# Patient Record
Sex: FEMALE | Race: WHITE | NOT HISPANIC OR LATINO | Employment: FULL TIME | URBAN - METROPOLITAN AREA
[De-identification: names, ages, dates, MRNs, and addresses within clinical notes are randomized per-mention and may not be internally consistent; named-entity substitution may affect disease eponyms.]

---

## 2019-05-08 PROBLEM — E66.811 CLASS 1 OBESITY DUE TO EXCESS CALORIES WITH SERIOUS COMORBIDITY AND BODY MASS INDEX (BMI) OF 34.0 TO 34.9 IN ADULT: Status: ACTIVE | Noted: 2017-12-07

## 2019-05-08 PROBLEM — E88.810 METABOLIC SYNDROME: Status: ACTIVE | Noted: 2017-10-09

## 2022-09-07 ENCOUNTER — TELEPHONE (OUTPATIENT)
Dept: INTERNAL MEDICINE CLINIC | Facility: OTHER | Age: 42
End: 2022-09-07

## 2023-06-07 ENCOUNTER — APPOINTMENT (OUTPATIENT)
Dept: LAB | Age: 43
End: 2023-06-07
Payer: COMMERCIAL

## 2023-06-07 DIAGNOSIS — R74.8 ELEVATED ALKALINE PHOSPHATASE LEVEL: ICD-10-CM

## 2023-06-07 DIAGNOSIS — E88.81 METABOLIC SYNDROME: ICD-10-CM

## 2023-06-07 LAB
ALBUMIN SERPL BCP-MCNC: 3.7 G/DL (ref 3.5–5)
ALP SERPL-CCNC: 114 U/L (ref 46–116)
ALT SERPL W P-5'-P-CCNC: 27 U/L (ref 12–78)
ANION GAP SERPL CALCULATED.3IONS-SCNC: 2 MMOL/L (ref 4–13)
AST SERPL W P-5'-P-CCNC: 16 U/L (ref 5–45)
BILIRUB DIRECT SERPL-MCNC: 0.11 MG/DL (ref 0–0.2)
BILIRUB SERPL-MCNC: 0.74 MG/DL (ref 0.2–1)
BUN SERPL-MCNC: 13 MG/DL (ref 5–25)
CALCIUM SERPL-MCNC: 8.9 MG/DL (ref 8.3–10.1)
CHLORIDE SERPL-SCNC: 111 MMOL/L (ref 96–108)
CO2 SERPL-SCNC: 26 MMOL/L (ref 21–32)
CREAT SERPL-MCNC: 0.68 MG/DL (ref 0.6–1.3)
GFR SERPL CREATININE-BSD FRML MDRD: 108 ML/MIN/1.73SQ M
GLUCOSE P FAST SERPL-MCNC: 86 MG/DL (ref 65–99)
POTASSIUM SERPL-SCNC: 4.5 MMOL/L (ref 3.5–5.3)
PROT SERPL-MCNC: 7 G/DL (ref 6.4–8.4)
SODIUM SERPL-SCNC: 139 MMOL/L (ref 135–147)

## 2023-06-07 PROCEDURE — 82248 BILIRUBIN DIRECT: CPT

## 2023-06-07 PROCEDURE — 86381 MITOCHONDRIAL ANTIBODY EACH: CPT

## 2023-06-07 PROCEDURE — 80053 COMPREHEN METABOLIC PANEL: CPT

## 2023-06-07 PROCEDURE — 36415 COLL VENOUS BLD VENIPUNCTURE: CPT

## 2023-06-08 LAB — MITOCHONDRIA M2 IGG SER-ACNC: <20 UNITS (ref 0–20)

## 2023-06-09 ENCOUNTER — OFFICE VISIT (OUTPATIENT)
Dept: INTERNAL MEDICINE CLINIC | Facility: CLINIC | Age: 43
End: 2023-06-09
Payer: COMMERCIAL

## 2023-06-09 VITALS
TEMPERATURE: 98.4 F | OXYGEN SATURATION: 96 % | HEART RATE: 86 BPM | HEIGHT: 63 IN | WEIGHT: 198.4 LBS | SYSTOLIC BLOOD PRESSURE: 120 MMHG | DIASTOLIC BLOOD PRESSURE: 94 MMHG | BODY MASS INDEX: 35.15 KG/M2

## 2023-06-09 DIAGNOSIS — E78.1 HYPERTRIGLYCERIDEMIA: ICD-10-CM

## 2023-06-09 DIAGNOSIS — Z12.31 ENCOUNTER FOR SCREENING MAMMOGRAM FOR MALIGNANT NEOPLASM OF BREAST: Primary | ICD-10-CM

## 2023-06-09 DIAGNOSIS — E55.9 VITAMIN D DEFICIENCY: ICD-10-CM

## 2023-06-09 DIAGNOSIS — E66.9 OBESITY (BMI 30.0-34.9): ICD-10-CM

## 2023-06-09 DIAGNOSIS — I10 BENIGN ESSENTIAL HYPERTENSION: ICD-10-CM

## 2023-06-09 DIAGNOSIS — F41.9 ANXIETY: ICD-10-CM

## 2023-06-09 PROCEDURE — 99214 OFFICE O/P EST MOD 30 MIN: CPT | Performed by: NURSE PRACTITIONER

## 2023-06-09 RX ORDER — LOSARTAN POTASSIUM AND HYDROCHLOROTHIAZIDE 12.5; 5 MG/1; MG/1
1 TABLET ORAL DAILY
Qty: 30 TABLET | Refills: 0 | Status: SHIPPED | OUTPATIENT
Start: 2023-06-09

## 2023-06-09 RX ORDER — LORAZEPAM 0.5 MG/1
0.5 TABLET ORAL EVERY 8 HOURS PRN
Qty: 10 TABLET | Refills: 0 | Status: SHIPPED | OUTPATIENT
Start: 2023-06-09

## 2023-06-09 NOTE — ASSESSMENT & PLAN NOTE
Uncontrolled, start losartan hydrochlorothiazide, discontinued lisinopril due to possible dry cough  C Continue to monitor blood pressure at home  Goal BP is < 130/80  Contact our office for consistent elevations  Recommend low sodium diet  Exercise 30 minutes 5 times a week as tolerated    Recommend yearly eye exam

## 2023-06-09 NOTE — PROGRESS NOTES
Assessment/Plan:    Benign essential hypertension  Uncontrolled, start losartan hydrochlorothiazide, discontinued lisinopril due to possible dry cough  C Continue to monitor blood pressure at home  Goal BP is < 130/80  Contact our office for consistent elevations  Recommend low sodium diet  Exercise 30 minutes 5 times a week as tolerated  Recommend yearly eye exam        Anxiety  Continue with Ativan as needed    Hypertriglyceridemia  Recommend healthy lifestyle choices for your cholesterol  Low fat/low cholesterol diet  Limit/avoid red meat  Eat more lean meat - chicken breast, ground turkey, fish  Exercise 30 mins at least 5 times a week as tolerated  Vitamin D deficiency  Continue with supplementation      BMI Counseling: Body mass index is 34 89 kg/m²  The BMI is above normal  Nutrition recommendations include decreasing portion sizes, encouraging healthy choices of fruits and vegetables, decreasing fast food intake, consuming healthier snacks, limiting drinks that contain sugar, moderation in carbohydrate intake, increasing intake of lean protein, reducing intake of saturated and trans fat and reducing intake of cholesterol  Exercise recommendations include moderate physical activity 150 minutes/week and exercising 3-5 times per week  Rationale for BMI follow-up plan is due to patient being overweight or obese  Depression Screening and Follow-up Plan: Patient was screened for depression during today's encounter  They screened negative with a PHQ-2 score of 0  Diagnoses and all orders for this visit:    Encounter for screening mammogram for malignant neoplasm of breast  -     Mammo screening bilateral w 3d & cad; Future    Benign essential hypertension  -     losartan-hydrochlorothiazide (HYZAAR) 50-12 5 mg per tablet; Take 1 tablet by mouth daily  -     Comprehensive metabolic panel;  Future  -     CBC and differential  -     Lipid panel    Anxiety  -     LORazepam (ATIVAN) 0 5 mg tablet; Take 1 tablet (0 5 mg total) by mouth every 8 (eight) hours as needed for anxiety    Obesity (BMI 30 0-34  9)    Hypertriglyceridemia    Vitamin D deficiency          Subjective:      Patient ID: Seble Whittaker is a 41 y o  female  Patient present today to follow up on blood work and HTN  Denies any new concerns    HTN- has been monitoring at home, -137, DBP 92-95, denies any chest pain, headaches or changes in vision, she reports a mild dry cough, she continues on lisinopril    Anxiety-continues with lorazepam as needed, uses very sparingly    HLD- ASVCD 1 1%      The following portions of the patient's history were reviewed and updated as appropriate: allergies, current medications, past family history, past medical history, past social history, past surgical history and problem list     Review of Systems   Constitutional: Negative for activity change, appetite change, chills, diaphoresis and fever  HENT: Negative for congestion, ear discharge, ear pain, postnasal drip, rhinorrhea, sinus pressure, sinus pain and sore throat  Eyes: Negative for pain, discharge, itching and visual disturbance  Respiratory: Negative for cough, chest tightness, shortness of breath and wheezing  Cardiovascular: Negative for chest pain, palpitations and leg swelling  Gastrointestinal: Negative for abdominal pain, constipation, diarrhea, nausea and vomiting  Endocrine: Negative for polydipsia, polyphagia and polyuria  Genitourinary: Negative for difficulty urinating, dysuria and urgency  Musculoskeletal: Negative for arthralgias, back pain and neck pain  Skin: Negative for rash and wound  Neurological: Negative for dizziness, weakness, numbness and headaches           Past Medical History:   Diagnosis Date   • Anxiety    • BRCA1 negative    • BRCA2 negative    • Chronic pain disorder 11/01/2017   • Depression    • [facility] 11/01/2017   • Facial contusion 2019   • History of migraine headaches    • PTSD (post-traumatic stress disorder)    • URTI (acute upper respiratory infection) 2014   • Weight gain 2017         Current Outpatient Medications:   •  albuterol (PROVENTIL HFA,VENTOLIN HFA) 90 mcg/act inhaler, Inhale 2 puffs every 6 (six) hours as needed for wheezing, Disp: 1 Inhaler, Rfl: 0  •  b complex vitamins capsule, Take 1 capsule by mouth daily, Disp: , Rfl:   •  cholecalciferol (VITAMIN D3) 1,000 units tablet, Take 1,000 Units by mouth daily, Disp: , Rfl:   •  LORazepam (ATIVAN) 0 5 mg tablet, Take 1 tablet (0 5 mg total) by mouth every 8 (eight) hours as needed for anxiety, Disp: 10 tablet, Rfl: 0  •  losartan-hydrochlorothiazide (HYZAAR) 50-12 5 mg per tablet, Take 1 tablet by mouth daily, Disp: 30 tablet, Rfl: 0  •  Misc Natural Products (Osteo Bi-Flex Adv Triple St) TABS, Take 2 tablets by mouth in the morning, Disp: , Rfl:   •  Multiple Vitamins-Minerals (MULTIVITAMIN ADULT PO), Take 1 tablet by mouth, Disp: , Rfl:   •  Omega-3 Fatty Acids (FISH OIL PO), Take 1,250 mg by mouth daily, Disp: , Rfl:     Allergies   Allergen Reactions   • Clindamycin Anaphylaxis     Anaphylaxis   • Other      Seasonal Allergies, Bee Stings   • Strawberry Extract - Food Allergy        Social History   Past Surgical History:   Procedure Laterality Date   • BREAST SURGERY      breat reduction   •  SECTION     • PARATHYROIDECTOMY     • REDUCTION MAMMAPLASTY Bilateral 2018   • TONSILLECTOMY       Family History   Problem Relation Age of Onset   • No Known Problems Mother    • Diabetes Father    • Stroke Father    • COPD Father    • Hyperlipidemia Father    • Hypertension Father    • Breast cancer Sister 52        [place]   • Breast cancer Cousin 27        4 rounds Breast cancer       Objective:  /94 (BP Location: Left arm, Patient Position: Sitting, Cuff Size: Standard)   Pulse 86   Temp 98 4 °F (36 9 °C) (Temporal)   Ht 5' "3 23\" (1 606 m)   Wt 90 kg (198 lb 6 4 oz)   SpO2 96%   BMI 34 89 kg/m²     Recent Results (from the past 1344 hour(s))   Antimitochondrial antibody    Collection Time: 06/07/23  9:34 AM   Result Value Ref Range    Mitochondrial Ab <20 0 0 0 - 20 0 Units   Comprehensive metabolic panel    Collection Time: 06/07/23  9:34 AM   Result Value Ref Range    Sodium 139 135 - 147 mmol/L    Potassium 4 5 3 5 - 5 3 mmol/L    Chloride 111 (H) 96 - 108 mmol/L    CO2 26 21 - 32 mmol/L    ANION GAP 2 (L) 4 - 13 mmol/L    BUN 13 5 - 25 mg/dL    Creatinine 0 68 0 60 - 1 30 mg/dL    Glucose, Fasting 86 65 - 99 mg/dL    Calcium 8 9 8 3 - 10 1 mg/dL    AST 16 5 - 45 U/L    ALT 27 12 - 78 U/L    Alkaline Phosphatase 114 46 - 116 U/L    Total Protein 7 0 6 4 - 8 4 g/dL    Albumin 3 7 3 5 - 5 0 g/dL    Total Bilirubin 0 74 0 20 - 1 00 mg/dL    eGFR 108 ml/min/1 73sq m   Bilirubin, direct    Collection Time: 06/07/23  9:34 AM   Result Value Ref Range    Bilirubin, Direct 0 11 0 00 - 0 20 mg/dL            Physical Exam  Constitutional:       General: She is not in acute distress  Appearance: She is well-developed  She is not diaphoretic  HENT:      Head: Normocephalic and atraumatic  Right Ear: External ear normal       Left Ear: External ear normal       Nose: Nose normal       Mouth/Throat:      Pharynx: No oropharyngeal exudate  Eyes:      General:         Right eye: No discharge  Left eye: No discharge  Conjunctiva/sclera: Conjunctivae normal       Pupils: Pupils are equal, round, and reactive to light  Neck:      Thyroid: No thyromegaly  Cardiovascular:      Rate and Rhythm: Normal rate and regular rhythm  Heart sounds: Normal heart sounds  No murmur heard  No friction rub  No gallop  Pulmonary:      Effort: Pulmonary effort is normal  No respiratory distress  Breath sounds: Normal breath sounds  No stridor  No wheezing or rales     Abdominal:      General: Bowel sounds are normal  There " is no distension  Palpations: Abdomen is soft  Tenderness: There is no abdominal tenderness  Musculoskeletal:      Cervical back: Normal range of motion and neck supple  Lymphadenopathy:      Cervical: No cervical adenopathy  Skin:     General: Skin is warm and dry  Findings: No erythema or rash  Neurological:      Mental Status: She is alert and oriented to person, place, and time  Psychiatric:         Behavior: Behavior normal          Thought Content:  Thought content normal          Judgment: Judgment normal

## 2023-07-04 DIAGNOSIS — I10 BENIGN ESSENTIAL HYPERTENSION: ICD-10-CM

## 2023-07-05 RX ORDER — LOSARTAN POTASSIUM AND HYDROCHLOROTHIAZIDE 12.5; 5 MG/1; MG/1
1 TABLET ORAL DAILY
Qty: 90 TABLET | Refills: 1 | Status: SHIPPED | OUTPATIENT
Start: 2023-07-05

## 2023-10-10 ENCOUNTER — OFFICE VISIT (OUTPATIENT)
Dept: PHYSICAL THERAPY | Facility: REHABILITATION | Age: 43
End: 2023-10-10
Payer: COMMERCIAL

## 2023-10-10 DIAGNOSIS — N94.6 DYSMENORRHEA: ICD-10-CM

## 2023-10-10 DIAGNOSIS — R10.2 PELVIC PAIN: Primary | ICD-10-CM

## 2023-10-10 DIAGNOSIS — M62.89 PELVIC FLOOR TENSION: ICD-10-CM

## 2023-10-10 DIAGNOSIS — R10.31 RIGHT LOWER QUADRANT ABDOMINAL PAIN: ICD-10-CM

## 2023-10-10 PROCEDURE — 97530 THERAPEUTIC ACTIVITIES: CPT | Performed by: PHYSICAL THERAPIST

## 2023-10-10 PROCEDURE — 97162 PT EVAL MOD COMPLEX 30 MIN: CPT | Performed by: PHYSICAL THERAPIST

## 2023-10-10 NOTE — PROGRESS NOTES
PT Evaluation     Today's date: 10/10/2023  Patient name: Miranda Wright  : 1980  MRN: [id number]  Referring provider: Jorge Gardner, PT  Dx:   Encounter Diagnosis     ICD-10-CM    1. Pelvic pain  R10.2       2. Dysmenorrhea  N94.6       3. Pelvic floor tension  M62.89       4. Right lower quadrant abdominal pain  R10.31           Start Time: 4203  Stop Time: 1500  Total time in clinic (min): 55 minutes    Assessment  Assessment details: Miranda Wright is a 41 y.o. female who presents with concerns of lower right abdominal pain along  scar tissue particularly during first 2 days of menses. . Examination reveals hypertonic and resulting weak PFM with poor sensory awareness, increased tension and tenderness along R LQ and layer 3 of pelvic floor musculature. The plan of care was discussed and included education regarding pelvic floor anatomy, explanation of exam technique, explanation of exam findings and discussion of treatment plan as well as the importance of patient compliance and adherence to physical therapy visits. Patient would benefit from skilled physical therapy services  to address deficits and ultimately meet goal of independent self management of condition. Patient provided written and verbal consent for pelvic floor muscle exam: yes      Impairments: abnormal coordination, abnormal muscle firing, abnormal muscle tone, abnormal or restricted ROM, activity intolerance, impaired physical strength, lacks appropriate home exercise program and pain with function  Understanding of Dx/Px/POC: good   Prognosis: good    Goals  STGs to be met in 4 weeks:  * Patient will be compliant with introductory HEP as prescribed. * Patient will report 25% less pain with her cycle  LTGs to be met by discharge:  * Implements relaxation strategies on a daily basis. * Demonstrates correct isolation and relaxation of pelvic floor to palpation without overflow from global stabilizers.   * Patient will report 80 % reduction in discomfort with her period along lower abdominal scar. * Patient will be compliant with comprehensive home exercise program for self management of condition. Plan  Patient would benefit from: skilled physical therapy  Referral necessary: No  Planned modality interventions: biofeedback  Planned therapy interventions: manual therapy, neuromuscular re-education, patient education, strengthening, stretching, therapeutic activities, therapeutic exercise, behavior modification and breathing training  Frequency: 1x week  Duration in weeks: 12  Plan of Care beginning date: 10/10/2023  Plan of Care expiration date: 2024  Treatment plan discussed with: patient        PT Pelvic Floor Subjective:   History of Present Illness:   Patient is  - first baby born at 42 weeks with minimal difficulties; second child born 6 years ago was born at 29 weeks via  (classical incision on her uterus)    Pain - day #1 of menses markedly painful x 6 years. Pain along abdominal scar and everything feels swollen and beat up. Recent ultrasound and biopsy - (-) endo, ? adynomyosis  Social Support:     Lives in:  Multiple-level home    Lives with:  Spouse and young children    Relationship status: /committed    Work status: employed full time (works for [employer] )    Stress level: stress related to work, family, sports with family  Pronouns: she/her  Diet and Exercise:      H/o LBP, neck pain and headaches - improved with therapy. ContraFect twice weekly  Walking regularly     Menstrual History:    Date of last menstrual period: 9/15/2023    Menstrual irregularities regular menses    Painful periods:  Difficulty managing menstrual pain (first day )    Tolerates tampons: menstrual cup.   [place] from age [age] yo  Bladder Function:     Voiding Difficulties positive for: urgency      Voiding Difficulties comments:     Voiding frequency: every 1-2 hours and every 3-4 hours    Urinary leakage: no urine leakage    Nocturia frequency: awakens 4 nights a week. Painful urination: No      Intake (ounces): Water intake (oz): 70 ounces. Coffee intake (oz): 16 ounces. Intake (ounces) comment: 12 ounces iced tea   Soda - occasional   Rare alcohol - once per week   Incontinence Management:     Patient has attempted at least 4 weeks of independent pelvic floor strengthening exercises without a resolution of symptoms  Bowel Function:     Voiding DIfficulties: unfinished feeling after defecating      Bowel frequency: daily    Cochise Stool Scale: type 4  Sexual Function:     Sexually Active:  Sexually active    Pain during intercourse: Yes (during ovulation due to low cervix)      Lubrication Use: No      Patient wishes to return to having intercourse: currently unable to have intercourse but wants to  Pain:     Current pain ratin    At best pain ratin    At worst pain ratin    Location:  Scar pain, feels    Onset:  More than 2 years ago    Quality:  Dull ache    Aggravating factors:  Menses    Pain duration: 36 hours     Relieving factors:  Medications and heat (ibuprofen (600-800mg) )      Objective       Abdominal Assessment:    Abdominal Assessment: TTP along R aspect of lower transverse incision. No scar tissue detected with good mobility in all directions. Cephalad traction of tissue reproduces pain symptoms and refers to R pubic symphysis. Skin inspection:   scars present. Number of scars: 1  Scar 1 location: transverse lower abdomen. Sensitivity level medium Restriction level low       General Perineum Exam:   perineum intact.      General perineum exam comments: No discharge, irritation, organ prolapse or skin breakdown evident    Large latent hemorrhoids, non-tender, normal skin color    Visual Inspection of Perineum:   Excursion of perineal body in cephalad direction with contraction of pelvic floor muscles (PFM): unable  Excursion of perineal body in caudal direction with relaxation of pelvic floor muscles (PFM): delayed  and weak  Involuntary contraction with coughing: no  Involuntary relaxation with bearing down: very minor. Cotton swab test: non-tender  Cough reflex: no cough reflex  Sphincter Tone Resting: normal  Sphincter Tone Squeeze: normal  Sensation: diminished    Pelvic Organ Prolapse   no pelvic organ prolapse  Perineal body inspection: within normal limits        Pelvic Floor Muscle Exam:    Muscle Contraction: unable to perform   Breathing pattern with contraction: holding breath   Pelvic floor muscle relaxation is delayed and incomplete. PERFECT Score   Power right: 0/5 (very trace contraction along posterior vaginal wall when VC "drink through a straw with vaginal muscles")   Power left: 0/5 (very trace contraction along posterior vaginal wall when VC "drink through a straw with vaginal muscles)   Endurance (seconds to max): 2   Repetitions (before fatigue): 1      pelvic floor exam consent given by patient    Pelvic exam completed: vaginally     SMEG Biofeedback   to be assessed next treatment               Precautions:   Patient Active Problem List   Diagnosis   • Acute back pain   • Anxiety   • Benign essential hypertension   • Obesity (BMI 30.0-34. 9)   • Metabolic syndrome   • High risk pregnancy with high inhibin   • Vitamin D deficiency   • Annual physical exam   • Gerry Gutting syndrome   • Hypertriglyceridemia   • Skin tag of perianal region   • Elevated alkaline phosphatase level   • BRCA1 negative           Manuals 10/10            PFM stretching Right             Scar tissue                                        Neuro Re-Ed             BF NV **                                                                                         Ther Ex                                                                                                                     Ther Activity             education anatomy and plan for treatment x10' Gait Training                                       Modalities

## 2023-10-17 ENCOUNTER — OFFICE VISIT (OUTPATIENT)
Dept: PHYSICAL THERAPY | Facility: REHABILITATION | Age: 43
End: 2023-10-17
Payer: COMMERCIAL

## 2023-10-17 DIAGNOSIS — N94.6 DYSMENORRHEA: ICD-10-CM

## 2023-10-17 DIAGNOSIS — M62.89 PELVIC FLOOR TENSION: ICD-10-CM

## 2023-10-17 DIAGNOSIS — R10.2 PELVIC PAIN: Primary | ICD-10-CM

## 2023-10-17 DIAGNOSIS — R10.31 RIGHT LOWER QUADRANT ABDOMINAL PAIN: ICD-10-CM

## 2023-10-17 PROCEDURE — 97112 NEUROMUSCULAR REEDUCATION: CPT | Performed by: PHYSICAL THERAPIST

## 2023-10-17 NOTE — PROGRESS NOTES
Daily Note     Today's date: 10/17/2023  Patient name: Lulu Wong  : 1980  MRN: [id number]  Referring provider: Josr Lea PT  Dx:   Encounter Diagnosis     ICD-10-CM    1. Pelvic pain  R10.2       2. Dysmenorrhea  N94.6       3. Pelvic floor tension  M62.89       4. Right lower quadrant abdominal pain  R10.31         Patient is  - first baby born at 42 weeks with minimal difficulties; second child born 6 years ago was born at 35 weeks via  (classical incision on her uterus)    Pain - day #1 of menses markedly painful x 6 years. Pain along abdominal scar and everything feels swollen and beat up. Recent ultrasound and biopsy - (-) endo, ? adynomyosis             Subjective: Offers no c/o after IE. Objective: See treatment diary below    Biofeedback performed in supine hooklying. Patient presents with increased resting tone. Performed 10 second active PFM contractions followed by 10 seconds of rest for 10 repetitions. Muscle activity during work phase measured 3.6 uV on average with the goal being > 12.0uV and muscle activity during rest phase measured 1.9 uV on average with the goal being < 2.5uV. Assessment: Tolerated treatment well. Patient would benefit from continued PT. We discussed fact that her BF readings can be deceiving but when we look at her resting baseline at start of session, it's evident that she has elevated resting tone that she was able to decrease with visual and audio feedback. She is still challenged from a sensory perspective so I advised that with her HEP (10:10x4', twice a day), she consider using her finger for manual feedback at the vaginal introitus. She expressed understanding and need to focus on releasing tension. Plan: Continue per plan of care. Precautions:   Patient Active Problem List   Diagnosis    Acute back pain    Anxiety    Benign essential hypertension    Obesity (BMI 30.0-34. 9)    Metabolic syndrome    High risk pregnancy with high inhibin    Vitamin D deficiency    Annual physical exam    Rexanne Reamer syndrome    Hypertriglyceridemia    Skin tag of perianal region    Elevated alkaline phosphatase level    BRCA1 negative           Manuals 10/10 10/17           PFM stretching Right             Scar tissue                                        Neuro Re-Ed             BF NV 55'                                                                                         Ther Ex                                                                                                                     Ther Activity             education anatomy and plan for treatment x10'                         Gait Training                                       Modalities

## 2023-10-18 ENCOUNTER — HOSPITAL ENCOUNTER (OUTPATIENT)
Dept: RADIOLOGY | Facility: IMAGING CENTER | Age: 43
Discharge: HOME | End: 2023-10-18
Payer: COMMERCIAL

## 2023-10-18 VITALS — WEIGHT: 190 LBS | HEIGHT: 63 IN | BODY MASS INDEX: 33.66 KG/M2

## 2023-10-18 DIAGNOSIS — Z12.31 ENCOUNTER FOR SCREENING MAMMOGRAM FOR MALIGNANT NEOPLASM OF BREAST: ICD-10-CM

## 2023-10-18 PROCEDURE — 77063 BREAST TOMOSYNTHESIS BI: CPT

## 2023-10-18 PROCEDURE — 77067 SCR MAMMO BI INCL CAD: CPT

## 2023-10-23 NOTE — PROGRESS NOTES
Daily Note     Today's date: 10/24/2023  Patient name: Lulu Wong  : 1980  MRN: [id number]  Referring provider: Josr Lea PT  Dx:   Encounter Diagnosis     ICD-10-CM    1. Pelvic pain  R10.2       2. Dysmenorrhea  N94.6       3. Pelvic floor tension  M62.89       4. Right lower quadrant abdominal pain  R10.31         Patient is  - first baby born at 42 weeks with minimal difficulties; second child born 6 years ago was born at 35 weeks via  (classical incision on her uterus)    Pain - day #1 of menses markedly painful x 6 years. Pain along abdominal scar and everything feels swollen and beat up. Recent ultrasound and biopsy - (-) endo, ? adynomyosis  Start Time: 5347  Stop Time: 1300  Total time in clinic (min): 55 minutes    Subjective: Reports that she got period day after therapy. Drove 3 1/2 hrs 4 nights ago with soreness along abdominal scar and some lower back pain as well. Has been been practicing relaxing of the pelvic muslces. Not too much pain today. Objective: See treatment diary below    Biofeedback performed in supine hooklying. Patient presents with increased resting tone. Performed 10 second active PFM contractions followed by 10 seconds of rest for 10 repetitions. Muscle activity during work phase measured 3.6 uV on average with the goal being > 12.0uV and muscle activity during rest phase measured 1.9 uV on average with the goal being < 2.5uV. Assessment: Tolerated treatment well. Patient would benefit from continued PT. Markedly challenged to engage pelvic floor which I think is likely due to tension. She has better (trace) contraction when VC to think rectally and when pulling in diaphragmatic breathing. Did ok with manual stretching vaginally without TTP. 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  Will email letter to request hi-low desk from employer as well as guided meditation and deep breath training to quiet her ANS. Also encouraged to walk while her kids play soccer and move while at work. Expressed understanding. Fascial restriction noted along left side of bladder which reduced approx 50% with manual techniques. Plan: Continue per plan of care. Precautions:   Patient Active Problem List   Diagnosis    Acute back pain    Anxiety    Benign essential hypertension    Obesity (BMI 30.0-34. 9)    Metabolic syndrome    High risk pregnancy with high inhibin    Vitamin D deficiency    Annual physical exam    Velta Cullman syndrome    Hypertriglyceridemia    Skin tag of perianal region    Elevated alkaline phosphatase level    BRCA1 negative           Manuals 10/10 10/17 10/24          PFM stretching Right   B x 15'          Scar tissue    5'          VC for proper C/R with breath   15'          VM along L bladder   10'          Neuro Re-Ed             BF NV 55'           DB   10'                                                                           Ther Ex                                                                                                                     Ther Activity             education anatomy and plan for treatment x10'                         Gait Training                                       Modalities

## 2023-10-24 ENCOUNTER — OFFICE VISIT (OUTPATIENT)
Dept: PHYSICAL THERAPY | Facility: REHABILITATION | Age: 43
End: 2023-10-24
Payer: COMMERCIAL

## 2023-10-24 DIAGNOSIS — R10.31 RIGHT LOWER QUADRANT ABDOMINAL PAIN: ICD-10-CM

## 2023-10-24 DIAGNOSIS — R10.2 PELVIC PAIN: Primary | ICD-10-CM

## 2023-10-24 DIAGNOSIS — M62.89 PELVIC FLOOR TENSION: ICD-10-CM

## 2023-10-24 DIAGNOSIS — N94.6 DYSMENORRHEA: ICD-10-CM

## 2023-10-24 PROCEDURE — 97140 MANUAL THERAPY 1/> REGIONS: CPT | Performed by: PHYSICAL THERAPIST

## 2023-10-24 PROCEDURE — 97530 THERAPEUTIC ACTIVITIES: CPT | Performed by: PHYSICAL THERAPIST

## 2023-11-02 ENCOUNTER — OFFICE VISIT (OUTPATIENT)
Dept: PHYSICAL THERAPY | Facility: REHABILITATION | Age: 43
End: 2023-11-02
Payer: COMMERCIAL

## 2023-11-02 DIAGNOSIS — N94.6 DYSMENORRHEA: ICD-10-CM

## 2023-11-02 DIAGNOSIS — M62.89 PELVIC FLOOR TENSION: ICD-10-CM

## 2023-11-02 DIAGNOSIS — R10.2 PELVIC PAIN: Primary | ICD-10-CM

## 2023-11-02 PROCEDURE — 97140 MANUAL THERAPY 1/> REGIONS: CPT

## 2023-11-02 PROCEDURE — 97112 NEUROMUSCULAR REEDUCATION: CPT

## 2023-11-02 NOTE — PROGRESS NOTES
Daily Note     Today's date: 2023  Patient name: Renetta Araujo  : 1980  MRN: [id number]  Referring provider: Zhanna Steele, PT  Dx:   Encounter Diagnosis     ICD-10-CM    1. Pelvic pain  R10.2       2. Dysmenorrhea  N94.6       3. Pelvic floor tension  M62.89           Patient is  - first baby born at 42 weeks with minimal difficulties; second child born 6 years ago was born at 35 weeks via  (classical incision on her uterus)    Pain - day #1 of menses markedly painful x 6 years. Pain along abdominal scar and everything feels swollen and beat up. Recent ultrasound and biopsy - (-) endo, ? adynomyosis  Start Time: 1155  Stop Time: 1245  Total time in clinic (min): 50 minutes    Subjective: Denies any pain today. Her conversation with Maya Bishop made sense to her in regards to her pelvic floor tension. Objective: See treatment diary below    Biofeedback performed in supine hooklying. Patient presents with increased resting tone. Performed 10 second active PFM contractions followed by 10 seconds of rest for 10 repetitions. Muscle activity during work phase measured 3.6 uV on average with the goal being > 12.0uV and muscle activity during rest phase measured 1.9 uV on average with the goal being < 2.5uV. Assessment: Tolerated treatment well. Patient would benefit from continued PT. Good tolerance to both external/internal techniques. We added the pelvic elevators to improve awareness, challenged but at times could detect a little relaxation. Responded well to fascia decompress over R side of scar & l/s paraspinals. Plan: Continue per plan of care. Precautions:   Patient Active Problem List   Diagnosis    Acute back pain    Anxiety    Benign essential hypertension    Obesity (BMI 30.0-34. 9)    Metabolic syndrome    High risk pregnancy with high inhibin    Vitamin D deficiency    Annual physical exam    Valla Eagles syndrome    Hypertriglyceridemia    Skin tag of perianal region    Elevated alkaline phosphatase level    BRCA1 negative           Manuals 10/10 10/17 10/24 11/02         PFM stretching Right   B x 15' 15'         Scar tissue    5'          VC for proper C/R with breath   15' 15'         L/s paraspinals    10'         VM along L bladder   10'          Neuro Re-Ed             BF NV 55'           DB   10'          Pelvic elevatrors    10'                                                             Ther Ex                                                                                                                     Ther Activity             education anatomy and plan for treatment x10'                         Gait Training                                       Modalities

## 2023-11-10 ENCOUNTER — OFFICE VISIT (OUTPATIENT)
Dept: PHYSICAL THERAPY | Facility: REHABILITATION | Age: 43
End: 2023-11-10
Payer: COMMERCIAL

## 2023-11-10 DIAGNOSIS — R10.31 RIGHT LOWER QUADRANT ABDOMINAL PAIN: ICD-10-CM

## 2023-11-10 DIAGNOSIS — N94.6 DYSMENORRHEA: ICD-10-CM

## 2023-11-10 DIAGNOSIS — M62.89 PELVIC FLOOR TENSION: ICD-10-CM

## 2023-11-10 DIAGNOSIS — R10.2 PELVIC PAIN: Primary | ICD-10-CM

## 2023-11-10 PROCEDURE — 97112 NEUROMUSCULAR REEDUCATION: CPT

## 2023-11-10 PROCEDURE — 97140 MANUAL THERAPY 1/> REGIONS: CPT

## 2023-11-10 NOTE — PROGRESS NOTES
Daily Note     Today's date: 11/10/2023  Patient name: Melia Dakins  : 1980  MRN: [id number]  Referring provider: Gianna Hensley, PT  Dx:   Encounter Diagnosis     ICD-10-CM    1. Pelvic pain  R10.2       2. Dysmenorrhea  N94.6       3. Pelvic floor tension  M62.89       4. Right lower quadrant abdominal pain  R10.31             Patient is  - first baby born at 42 weeks with minimal difficulties; second child born 6 years ago was born at 35 weeks via  (classical incision on her uterus)    Pain - day #1 of menses markedly painful x 6 years. Pain along abdominal scar and everything feels swollen and beat up. Recent ultrasound and biopsy - (-) endo, ? adynomyosis  Start Time: 1000  Stop Time: 1030  Total time in clinic (min): 30 minutes    Subjective: Denies any pain today, should have her period in 10 days. Objective: See treatment diary below    Biofeedback performed in supine hooklying. Patient presents with increased resting tone. Performed 10 second active PFM contractions followed by 10 seconds of rest for 10 repetitions. Muscle activity during work phase measured 3.6 uV on average with the goal being > 12.0uV and muscle activity during rest phase measured 1.9 uV on average with the goal being < 2.5uV. Assessment: Tolerated treatment well. Patient would benefit from continued PT. Session modified due to tardiness. Added strengthening exercises with focus on relaxing post contraction. Access Code: [email or web address]  URL: [email or web address]  Date: 11/10/2023  Prepared by: Macy Warren    Program Notes  Use your tool for cueing :)    Exercises  - Quadruped Exhale with Pelvic Floor Contraction  - 1 x daily - 7 x weekly - 2 sets - 10 reps - 5 hold  - Supine Bridge with Pelvic Floor Contraction and Hip Rotation  - 1 x daily - 7 x weekly - 2 sets - 10 reps - 5 hold  - Supine Posterior Pelvic Tilt with Pelvic Floor Contraction  - 1 x daily - 7 x weekly - 2 sets - 10 reps - 5 hold  - Hip Hinge Rock Back  - 1 x daily - 7 x weekly - 2 sets - 10 reps - 5 hold  - Seated Pelvic Floor Contraction with Isometric Hip Adduction  - 1 x daily - 7 x weekly - 2 sets - 10 reps - 5 hold  - Seated Pelvic Floor Contraction with Hip Abduction and Resistance Loop  - 1 x daily - 7 x weekly - 2 sets - 10 reps - 5 hold    Plan: Continue per plan of care. Precautions:   Patient Active Problem List   Diagnosis    Acute back pain    Anxiety    Benign essential hypertension    Obesity (BMI 30.0-34. 9)    Metabolic syndrome    High risk pregnancy with high inhibin    Vitamin D deficiency    Annual physical exam    Idamae Yoseph syndrome    Hypertriglyceridemia    Skin tag of perianal region    Elevated alkaline phosphatase level    BRCA1 negative           Manuals 10/10 10/17 10/24 11/02 11/10        PFM stretching Right   B x 15' 15'         Scar tissue    5'          VC for proper C/R with breath   15' 15' 10'        L/s paraspinals    10'         VM along L bladder   10'          Neuro Re-Ed             BF NV 55'           DB   10'          Pelvic elevatrors    10'         bridges     10x        Hip abd/add     10x        Q-ped PFM     10x        PFM hooklying w/ posterior pelvic tilt     10x                     Ther Ex                                                                                                                     Ther Activity             education anatomy and plan for treatment x10'                         Gait Training                                       Modalities

## 2023-11-16 NOTE — PROGRESS NOTES
Daily Note     Today's date: 2023  Patient name: Nelly Baca  : 1980  MRN: [id number]  Referring provider: Navin Chapa, PT  Dx:   Encounter Diagnosis     ICD-10-CM    1. Pelvic pain  R10.2       2. Dysmenorrhea  N94.6               Patient is  - first baby born at 42 weeks with minimal difficulties; second child born 6 years ago was born at 35 weeks via  (classical incision on her uterus)    Pain - day #1 of menses markedly painful x 6 years. Pain along abdominal scar and everything feels swollen and beat up. Recent ultrasound and biopsy - (-) endo, ? adynomyosis  Start Time: 1030  Stop Time: 1120  Total time in clinic (min): 50 minutes    Subjective: No discomfort or pain. Objective: See treatment diary below    Biofeedback performed in supine hooklying. Patient presents with increased resting tone. Performed 10 second active PFM contractions followed by 10 seconds of rest for 10 repetitions. Muscle activity during work phase measured 3.6 uV on average with the goal being > 12.0uV and muscle activity during rest phase measured 1.9 uV on average with the goal being < 2.5uV. Assessment: Tolerated treatment well. Patient would benefit from continued PT. Added reformer exercises which were challenging but able to maintain good form and pace. Goals  STGs to be met in 4 weeks:  * Patient will be compliant with introductory HEP as prescribed. * Patient will report 25% less pain with her cycle  LTGs to be met by discharge:  * Implements relaxation strategies on a daily basis. * Demonstrates correct isolation and relaxation of pelvic floor to palpation without overflow from global stabilizers. * Patient will report 80 % reduction in discomfort with her period along lower abdominal scar. * Patient will be compliant with comprehensive home exercise program for self management of condition. Access Code: [email or web address]  URL: [email or web address]  Date: 11/10/2023  Prepared by: [facility]    Program Notes  Use your tool for cueing :)    Exercises  - Quadruped Exhale with Pelvic Floor Contraction  - 1 x daily - 7 x weekly - 2 sets - 10 reps - 5 hold  - Supine Bridge with Pelvic Floor Contraction and Hip Rotation  - 1 x daily - 7 x weekly - 2 sets - 10 reps - 5 hold  - Supine Posterior Pelvic Tilt with Pelvic Floor Contraction  - 1 x daily - 7 x weekly - 2 sets - 10 reps - 5 hold  - Hip Hinge Rock Back  - 1 x daily - 7 x weekly - 2 sets - 10 reps - 5 hold  - Seated Pelvic Floor Contraction with Isometric Hip Adduction  - 1 x daily - 7 x weekly - 2 sets - 10 reps - 5 hold  - Seated Pelvic Floor Contraction with Hip Abduction and Resistance Loop  - 1 x daily - 7 x weekly - 2 sets - 10 reps - 5 hold    Plan: Continue per plan of care. Precautions:   Patient Active Problem List   Diagnosis    Acute back pain    Anxiety    Benign essential hypertension    Obesity (BMI 30.0-34. 9)    Metabolic syndrome    High risk pregnancy with high inhibin    Vitamin D deficiency    Annual physical exam    Karlee Puls syndrome    Hypertriglyceridemia    Skin tag of perianal region    Elevated alkaline phosphatase level    BRCA1 negative           Manuals 10/10 10/17 10/24 11/02 11/10 11/17       PFM stretching Right   B x 15' 15'         Scar tissue    5'          VC for proper C/R with breath   15' 15' 10'        L/s paraspinals    10'         VM along L bladder   10'          Neuro Re-Ed             BF NV 55'           DB   10'          Pelvic elevatrors    10'         bridges     10x        Hip abd/add     10x        Q-ped PFM     10x        PFM hooklying w/ posterior pelvic tilt     10x        Reformer supine      1B heel together, heels at the end of the bar, bridges w/ add, straight legs, b/l adductors, single adductor       Reformer q-ped       1B hip extension, wheelbarrows both directions       Reformer tall kneeling      1B hip kev with pushing pole       Reformer standing 1B reverse lunges       Ther Ex             TM      8'                                                                                                  Ther Activity             education anatomy and plan for treatment x10'                         Gait Training                                       Modalities

## 2023-11-17 ENCOUNTER — OFFICE VISIT (OUTPATIENT)
Dept: PHYSICAL THERAPY | Facility: REHABILITATION | Age: 43
End: 2023-11-17
Payer: COMMERCIAL

## 2023-11-17 DIAGNOSIS — R10.2 PELVIC PAIN: Primary | ICD-10-CM

## 2023-11-17 DIAGNOSIS — N94.6 DYSMENORRHEA: ICD-10-CM

## 2023-11-17 PROCEDURE — 97110 THERAPEUTIC EXERCISES: CPT

## 2023-11-17 PROCEDURE — 97112 NEUROMUSCULAR REEDUCATION: CPT

## 2023-11-20 ENCOUNTER — APPOINTMENT (OUTPATIENT)
Dept: PHYSICAL THERAPY | Facility: REHABILITATION | Age: 43
End: 2023-11-20
Payer: COMMERCIAL

## 2023-11-29 ENCOUNTER — OFFICE VISIT (OUTPATIENT)
Dept: PHYSICAL THERAPY | Facility: REHABILITATION | Age: 43
End: 2023-11-29
Payer: COMMERCIAL

## 2023-11-29 DIAGNOSIS — R10.2 PELVIC PAIN: Primary | ICD-10-CM

## 2023-11-29 DIAGNOSIS — M62.89 PELVIC FLOOR TENSION: ICD-10-CM

## 2023-11-29 DIAGNOSIS — N94.6 DYSMENORRHEA: ICD-10-CM

## 2023-11-29 DIAGNOSIS — R10.31 RIGHT LOWER QUADRANT ABDOMINAL PAIN: ICD-10-CM

## 2023-11-29 PROCEDURE — 97140 MANUAL THERAPY 1/> REGIONS: CPT

## 2023-11-29 PROCEDURE — 97112 NEUROMUSCULAR REEDUCATION: CPT

## 2023-11-29 NOTE — PROGRESS NOTES
Daily Note     Today's date: 2023  Patient name: Jillian Lassiter  : 1980  MRN: [id number]  Referring provider: Allan Leyva PT  Dx:   Encounter Diagnosis     ICD-10-CM    1. Pelvic pain  R10.2       2. Dysmenorrhea  N94.6       3. Pelvic floor tension  M62.89       4. Right lower quadrant abdominal pain  R10.31                 Patient is  - first baby born at 42 weeks with minimal difficulties; second child born 6 years ago was born at 35 weeks via  (classical incision on her uterus)    Pain - day #1 of menses markedly painful x 6 years. Pain along abdominal scar and everything feels swollen and beat up. Recent ultrasound and biopsy - (-) endo, ? adynomyosis  Start Time: 1050  Stop Time: 1133  Total time in clinic (min): 43 minutes    Subjective: Pt is pleased is currently on day 5 of her period and feels her symptoms have improved "it's weird but it's better". Pt does take a diuretic at night, feels its the reason she sometimes get up at 5am.      Objective: See treatment diary below      Assessment: Tolerated treatment well. Patient would benefit from continued PT. Performed STM & fascia decompression. Pulling reproduced on her R side but mild according to pt. Following manual used the reformer. Good tolerance and form. Goals  STGs to be met in 4 weeks:  * Patient will be compliant with introductory HEP as prescribed. GOAL MET   * Patient will report 25% less pain with her cycle  GOAL MET   LTGs to be met by discharge:  * Implements relaxation strategies on a daily basis. * Demonstrates correct isolation and relaxation of pelvic floor to palpation without overflow from global stabilizers. * Patient will report 80 % reduction in discomfort with her period along lower abdominal scar. * Patient will be compliant with comprehensive home exercise program for self management of condition. Access Code: [email or web address]  URL: [email or web address]  Date: 11/10/2023  Prepared by: Donnie LangeClearleape    Program Notes  Use your tool for cueing :)    Exercises  - Quadruped Exhale with Pelvic Floor Contraction  - 1 x daily - 7 x weekly - 2 sets - 10 reps - 5 hold  - Supine Bridge with Pelvic Floor Contraction and Hip Rotation  - 1 x daily - 7 x weekly - 2 sets - 10 reps - 5 hold  - Supine Posterior Pelvic Tilt with Pelvic Floor Contraction  - 1 x daily - 7 x weekly - 2 sets - 10 reps - 5 hold  - Hip Hinge Rock Back  - 1 x daily - 7 x weekly - 2 sets - 10 reps - 5 hold  - Seated Pelvic Floor Contraction with Isometric Hip Adduction  - 1 x daily - 7 x weekly - 2 sets - 10 reps - 5 hold  - Seated Pelvic Floor Contraction with Hip Abduction and Resistance Loop  - 1 x daily - 7 x weekly - 2 sets - 10 reps - 5 hold    Plan: Continue per plan of care. Precautions:   Patient Active Problem List   Diagnosis    Acute back pain    Anxiety    Benign essential hypertension    Obesity (BMI 30.0-34. 9)    Metabolic syndrome    High risk pregnancy with high inhibin    Vitamin D deficiency    Annual physical exam    Beto Nazario syndrome    Hypertriglyceridemia    Skin tag of perianal region    Elevated alkaline phosphatase level    BRCA1 negative           Manuals 10/10 10/17 10/24 11/02 11/10 11/17 11/29      PFM stretching Right   B x 15' 15'         Scar tissue    5'    20' STM & fascia decompression      VC for proper C/R with breath   15' 15' 10'        L/s paraspinals    10'         VM along L bladder   10'          Neuro Re-Ed             BF NV 55'           DB   10'          Pelvic elevatrors    10'         bridges     10x        Hip abd/add     10x        Q-ped PFM     10x        PFM hooklying w/ posterior pelvic tilt     10x        Reformer supine      1B heel together, heels at the end of the bar, bridges w/ add, straight legs, b/l adductors, single adductor 1B heel together, heels at the end of the bar, bridges w/ add, straight legs, b/l adductors, single adductor  Straight legs into circles      Reformer q-ped       1B hip extension, wheelbarrows both directions 1B hip extension, wheelbarrows both directions         Reformer tall kneeling      1B hip kev with pushing pole       Reformer standing      1B reverse lunges 1 B reverse lunges 2 positions      Ther Ex             TM      8'                                                                                                  Ther Activity             education anatomy and plan for treatment x10'                         Gait Training                                       Modalities

## 2023-12-06 ENCOUNTER — TELEPHONE (OUTPATIENT)
Age: 43
End: 2023-12-06

## 2023-12-06 NOTE — PROGRESS NOTES
Daily Note     Today's date: 2023  Patient name: Carmen Allred  : 1980  MRN: [id number]  Referring provider: Letitia Acosta, PT  Dx:   Encounter Diagnosis     ICD-10-CM    1. Pelvic pain  R10.2       2. Dysmenorrhea  N94.6       3. Pelvic floor tension  M62.89       4. Right lower quadrant abdominal pain  R10.31                 Patient is  - first baby born at 42 weeks with minimal difficulties; second child born 6 years ago was born at 35 weeks via  (classical incision on her uterus)    Pain - day #1 of menses markedly painful x 6 years. Pain along abdominal scar and everything feels swollen and beat up. Recent ultrasound and biopsy - (-) endo, ? adynomyosis             Subjective: Overall happy that her pain with menses was better last week. She reports minimal pain along  scar. "I think the relaxation exercises have helped me a lot."    Objective: See treatment diary below    Goals  STGs to be met in 4 weeks:  * Patient will be compliant with introductory HEP as prescribed. * Patient will report 25% less pain with her cycle  LTGs to be met by discharge:  * Implements relaxation strategies on a daily basis. * Demonstrates correct isolation and relaxation of pelvic floor to palpation without overflow from global stabilizers. * Patient will report 80 % reduction in discomfort with her period along lower abdominal scar. * Patient will be compliant with comprehensive home exercise program for self management of condition. Assessment: Tolerated treatment well. Patient would benefit from continued PT. We discussed fact that she has done really but her c/c was pain with menses and she has only had one menstrual cycle since [facility]. We agreed to hold treatment for now as she continues to work on relaxation and exercises at home and I will touch base with her in the [address] [facility] [address]. If she is doing well, will discharge at that time.      Goals  STGs to be met in 4 weeks:  * Patient will be compliant with introductory HEP as prescribed. GOAL MET 11/29  * Patient will report 25% less pain with her cycle  GOAL MET 11/29  LTGs to be met by discharge:  * Implements relaxation strategies on a daily basis. * Demonstrates correct isolation and relaxation of pelvic floor to palpation without overflow from global stabilizers. * Patient will report 80 % reduction in discomfort with her period along lower abdominal scar. * Patient will be compliant with comprehensive home exercise program for self management of condition. Access Code: [email or web address]  URL: [email or web address]  Date: 11/10/2023  Prepared by: Dell Larry    Program Notes  Use your tool for cueing :)    Exercises  - Quadruped Exhale with Pelvic Floor Contraction  - 1 x daily - 7 x weekly - 2 sets - 10 reps - 5 hold  - Supine Bridge with Pelvic Floor Contraction and Hip Rotation  - 1 x daily - 7 x weekly - 2 sets - 10 reps - 5 hold  - Supine Posterior Pelvic Tilt with Pelvic Floor Contraction  - 1 x daily - 7 x weekly - 2 sets - 10 reps - 5 hold  - Hip Hinge Rock Back  - 1 x daily - 7 x weekly - 2 sets - 10 reps - 5 hold  - Seated Pelvic Floor Contraction with Isometric Hip Adduction  - 1 x daily - 7 x weekly - 2 sets - 10 reps - 5 hold  - Seated Pelvic Floor Contraction with Hip Abduction and Resistance Loop  - 1 x daily - 7 x weekly - 2 sets - 10 reps - 5 hold    Plan: Continue per plan of care. Precautions:   Patient Active Problem List   Diagnosis    Acute back pain    Anxiety    Benign essential hypertension    Obesity (BMI 30.0-34. 9)    Metabolic syndrome    High risk pregnancy with high inhibin    Vitamin D deficiency    Annual physical exam    Elgie Dimes syndrome    Hypertriglyceridemia    Skin tag of perianal region    Elevated alkaline phosphatase level    BRCA1 negative           Manuals 10/10 10/17 10/24 11/02 11/10 11/17 11/29 12/7     PFM stretching Right   B x 15' 15'         Scar tissue    5'    20' [facility] & fascia decompression 20'     VC for proper C/R with breath   15' 15' 10'        L/s paraspinals    10'         VM along L bladder   10'          Neuro Re-Ed             BF NV 55'           DB   10'          Pelvic elevatrors    10'         bridges     10x        Hip abd/add     10x        Q-ped PFM     10x        PFM hooklying w/ posterior pelvic tilt     10x        Reformer supine      1B heel together, heels at the end of the bar, bridges w/ add, straight legs, b/l adductors, single adductor 1B heel together, heels at the end of the bar, bridges w/ add, straight legs, b/l adductors, single adductor  Straight legs into circles 1B heel together, heels at the end of the bar, bridges w/ add, straight legs, b/l adductors, single adductor  Straight legs into circles     Reformer q-ped       1B hip extension, wheelbarrows both directions 1B hip extension, wheelbarrows both directions    1B 1W hip extension, wheelbarrows both directions     Reformer tall kneeling      1B hip kev with pushing pole       Reformer standing      1B reverse lunges 1 B reverse lunges 2 positions      Ther Ex             TM      8'  8'                                                                                                 Ther Activity             education anatomy and plan for treatment x10'       Self mgt strategies                  Gait Training                                       Modalities

## 2023-12-07 ENCOUNTER — OFFICE VISIT (OUTPATIENT)
Dept: PHYSICAL THERAPY | Facility: REHABILITATION | Age: 43
End: 2023-12-07
Payer: COMMERCIAL

## 2023-12-07 DIAGNOSIS — N94.6 DYSMENORRHEA: ICD-10-CM

## 2023-12-07 DIAGNOSIS — M62.89 PELVIC FLOOR TENSION: ICD-10-CM

## 2023-12-07 DIAGNOSIS — R10.2 PELVIC PAIN: Primary | ICD-10-CM

## 2023-12-07 DIAGNOSIS — R10.31 RIGHT LOWER QUADRANT ABDOMINAL PAIN: ICD-10-CM

## 2023-12-07 PROCEDURE — 97110 THERAPEUTIC EXERCISES: CPT | Performed by: PHYSICAL THERAPIST

## 2023-12-07 PROCEDURE — 97140 MANUAL THERAPY 1/> REGIONS: CPT | Performed by: PHYSICAL THERAPIST

## 2023-12-07 PROCEDURE — 97530 THERAPEUTIC ACTIVITIES: CPT | Performed by: PHYSICAL THERAPIST

## 2023-12-11 ENCOUNTER — HOSPITAL ENCOUNTER (OUTPATIENT)
Dept: ULTRASOUND IMAGING | Facility: CLINIC | Age: 43
Discharge: HOME | End: 2023-12-11
Payer: COMMERCIAL

## 2023-12-11 ENCOUNTER — HOSPITAL ENCOUNTER (OUTPATIENT)
Dept: MAMMOGRAPHY | Facility: CLINIC | Age: 43
Discharge: HOME | End: 2023-12-11
Payer: COMMERCIAL

## 2023-12-11 VITALS — BODY MASS INDEX: 33.66 KG/M2 | HEIGHT: 63 IN | WEIGHT: 190 LBS

## 2023-12-11 DIAGNOSIS — R92.8 ABNORMAL MAMMOGRAM: ICD-10-CM

## 2023-12-11 PROCEDURE — 76642 ULTRASOUND BREAST LIMITED: CPT

## 2023-12-11 PROCEDURE — G0279 TOMOSYNTHESIS, MAMMO: HCPCS

## 2023-12-11 PROCEDURE — 77065 DX MAMMO INCL CAD UNI: CPT

## 2023-12-22 ENCOUNTER — OFFICE VISIT (OUTPATIENT)
Age: 43
End: 2023-12-22
Payer: COMMERCIAL

## 2023-12-22 VITALS
BODY MASS INDEX: 32.71 KG/M2 | HEART RATE: 85 BPM | SYSTOLIC BLOOD PRESSURE: 104 MMHG | WEIGHT: 191.6 LBS | HEIGHT: 64 IN | DIASTOLIC BLOOD PRESSURE: 80 MMHG | TEMPERATURE: 98.2 F | OXYGEN SATURATION: 98 %

## 2023-12-22 DIAGNOSIS — F41.9 ANXIETY: ICD-10-CM

## 2023-12-22 DIAGNOSIS — I10 BENIGN ESSENTIAL HYPERTENSION: ICD-10-CM

## 2023-12-22 DIAGNOSIS — E78.1 HYPERTRIGLYCERIDEMIA: ICD-10-CM

## 2023-12-22 DIAGNOSIS — Z23 ENCOUNTER FOR IMMUNIZATION: Primary | ICD-10-CM

## 2023-12-22 PROCEDURE — 99213 OFFICE O/P EST LOW 20 MIN: CPT | Performed by: NURSE PRACTITIONER

## 2023-12-22 PROCEDURE — 90686 IIV4 VACC NO PRSV 0.5 ML IM: CPT

## 2023-12-22 PROCEDURE — 90471 IMMUNIZATION ADMIN: CPT

## 2023-12-22 RX ORDER — LORAZEPAM 0.5 MG/1
0.5 TABLET ORAL EVERY 8 HOURS PRN
Qty: 10 TABLET | Refills: 0 | Status: SHIPPED | OUTPATIENT
Start: 2023-12-22

## 2023-12-22 NOTE — PROGRESS NOTES
Assessment/Plan:    Benign essential hypertension  Controlled on losartan hydrochlorothiazide, discontinued lisinopril due to possible dry cough. C Continue to monitor blood pressure at home.  Goal BP is < 130/80.  Contact our office for consistent elevations.  Recommend low sodium diet.  Exercise 30 minutes 5 times a week as tolerated.  Recommend yearly eye exam.       Hypertriglyceridemia  Recommend healthy lifestyle choices for your cholesterol.  Low fat/low cholesterol diet.  Limit/avoid red meat.  Eat more lean meat - chicken breast, ground turkey, fish.  Exercise 30 mins at least 5 times a week as tolerated.        Anxiety  Continue with Ativan as needed        Depression Screening and Follow-up Plan: Patient was screened for depression during today's encounter. They screened negative with a PHQ-2 score of 0.           Diagnoses and all orders for this visit:    Encounter for immunization  -     influenza vaccine, quadrivalent, 0.5 mL, preservative-free, for adult and pediatric patients 6 mos+ (AFLURIA, FLUARIX, FLULAVAL, FLUZONE)    Anxiety  -     LORazepam (ATIVAN) 0.5 mg tablet; Take 1 tablet (0.5 mg total) by mouth every 8 (eight) hours as needed for anxiety    Benign essential hypertension    Hypertriglyceridemia          Subjective:      Patient ID: Kandis Puga is a 43 y.o. female.    Patient present today to follow up on blood work and HTN.  Denies any new concerns    HTN- has been monitoring at home, -137, DBP 92-95, denies any chest pain, headaches or changes in vision, she reports a mild dry cough, she continues on losartan-hctz    Anxiety-continues with lorazepam as needed, uses very sparingly    HLD- ASVCD 1.4%        The following portions of the patient's history were reviewed and updated as appropriate: allergies, current medications, past family history, past medical history, past social history, past surgical history, and problem list.    Review of Systems   Constitutional:   Negative for activity change, appetite change, chills, diaphoresis and fever.   HENT:  Negative for congestion, ear discharge, ear pain, postnasal drip, rhinorrhea, sinus pressure, sinus pain and sore throat.    Eyes:  Negative for pain, discharge, itching and visual disturbance.   Respiratory:  Negative for cough, chest tightness, shortness of breath and wheezing.    Cardiovascular:  Negative for chest pain, palpitations and leg swelling.   Gastrointestinal:  Negative for abdominal pain, constipation, diarrhea, nausea and vomiting.   Endocrine: Negative for polydipsia, polyphagia and polyuria.   Genitourinary:  Negative for difficulty urinating, dysuria and urgency.   Musculoskeletal:  Negative for arthralgias, back pain and neck pain.   Skin:  Negative for rash and wound.   Neurological:  Negative for dizziness, weakness, numbness and headaches.         Past Medical History:   Diagnosis Date    Anxiety     BRCA1 negative     BRCA2 negative     Chronic pain disorder 11/01/2017    Depression     Depression, major, recurrent, moderate (HCC) 11/01/2017    Facial contusion 11/25/2019    History of migraine headaches     PTSD (post-traumatic stress disorder)     URTI (acute upper respiratory infection) 09/09/2014    Weight gain 01/03/2017         Current Outpatient Medications:     albuterol (PROVENTIL HFA,VENTOLIN HFA) 90 mcg/act inhaler, Inhale 2 puffs every 6 (six) hours as needed for wheezing, Disp: 1 Inhaler, Rfl: 0    b complex vitamins capsule, Take 1 capsule by mouth daily, Disp: , Rfl:     cholecalciferol (VITAMIN D3) 1,000 units tablet, Take 1,000 Units by mouth daily, Disp: , Rfl:     LORazepam (ATIVAN) 0.5 mg tablet, Take 1 tablet (0.5 mg total) by mouth every 8 (eight) hours as needed for anxiety, Disp: 10 tablet, Rfl: 0    losartan-hydrochlorothiazide (HYZAAR) 50-12.5 mg per tablet, Take 1 tablet by mouth daily, Disp: 90 tablet, Rfl: 1    Misc Natural Products (Osteo Bi-Flex Adv Triple St) TABS, Take 2  "tablets by mouth in the morning, Disp: , Rfl:     Multiple Vitamins-Minerals (MULTIVITAMIN ADULT PO), Take 1 tablet by mouth, Disp: , Rfl:     Omega-3 Fatty Acids (FISH OIL PO), Take 1,250 mg by mouth daily, Disp: , Rfl:     Allergies   Allergen Reactions    Clindamycin Anaphylaxis     Anaphylaxis    Other      Seasonal Allergies, Bee Stings    Strawberry Extract - Food Allergy        Social History   Past Surgical History:   Procedure Laterality Date    BREAST SURGERY      breat reduction     SECTION  2015    PARATHYROIDECTOMY  2002    REDUCTION MAMMAPLASTY Bilateral 2018    TONSILLECTOMY  2000     Family History   Problem Relation Age of Onset    No Known Problems Mother     Diabetes Father     Stroke Father     COPD Father     Hyperlipidemia Father     Hypertension Father     No Known Problems Maternal Grandmother     No Known Problems Maternal Grandfather     No Known Problems Paternal Grandmother     No Known Problems Paternal Grandfather     No Known Problems Son     No Known Problems Son     No Known Problems Maternal Uncle     No Known Problems Paternal Aunt     No Known Problems Paternal Uncle     Breast cancer Cousin 27        4 rounds Breast cancer    Breast cancer Half-Sister 47       Objective:  /80 (BP Location: Left arm, Patient Position: Sitting, Cuff Size: Standard)   Pulse 85   Temp 98.2 °F (36.8 °C) (Temporal)   Ht 5' 3.58\" (1.615 m)   Wt 86.9 kg (191 lb 9.6 oz)   SpO2 98%   BMI 33.32 kg/m²     No results found for this or any previous visit (from the past 1344 hour(s)).         Physical Exam  Constitutional:       General: She is not in acute distress.     Appearance: She is well-developed. She is not diaphoretic.   HENT:      Head: Normocephalic and atraumatic.      Right Ear: External ear normal.      Left Ear: External ear normal.      Nose: Nose normal.      Mouth/Throat:      Mouth: Mucous membranes are moist.      Pharynx: No oropharyngeal exudate or posterior " oropharyngeal erythema.   Eyes:      General:         Right eye: No discharge.         Left eye: No discharge.      Conjunctiva/sclera: Conjunctivae normal.      Pupils: Pupils are equal, round, and reactive to light.   Neck:      Thyroid: No thyromegaly.   Cardiovascular:      Rate and Rhythm: Normal rate and regular rhythm.      Heart sounds: Normal heart sounds. No murmur heard.     No friction rub. No gallop.   Pulmonary:      Effort: Pulmonary effort is normal. No respiratory distress.      Breath sounds: Normal breath sounds. No stridor. No wheezing or rales.   Abdominal:      General: Bowel sounds are normal. There is no distension.      Palpations: Abdomen is soft.      Tenderness: There is no abdominal tenderness.   Musculoskeletal:      Cervical back: Normal range of motion and neck supple.   Lymphadenopathy:      Cervical: No cervical adenopathy.   Skin:     General: Skin is warm and dry.      Findings: No erythema or rash.   Neurological:      Mental Status: She is alert and oriented to person, place, and time.   Psychiatric:         Behavior: Behavior normal.         Thought Content: Thought content normal.         Judgment: Judgment normal.

## 2023-12-22 NOTE — ASSESSMENT & PLAN NOTE
Recommend healthy lifestyle choices for your cholesterol.  Low fat/low cholesterol diet.  Limit/avoid red meat.  Eat more lean meat - chicken breast, ground turkey, fish.  Exercise 30 mins at least 5 times a week as tolerated.

## 2023-12-22 NOTE — ASSESSMENT & PLAN NOTE
Controlled on losartan hydrochlorothiazide, discontinued lisinopril due to possible dry cough. C Continue to monitor blood pressure at home.  Goal BP is < 130/80.  Contact our office for consistent elevations.  Recommend low sodium diet.  Exercise 30 minutes 5 times a week as tolerated.  Recommend yearly eye exam.

## 2024-01-03 DIAGNOSIS — I10 BENIGN ESSENTIAL HYPERTENSION: ICD-10-CM

## 2024-01-04 RX ORDER — LOSARTAN POTASSIUM AND HYDROCHLOROTHIAZIDE 12.5; 5 MG/1; MG/1
1 TABLET ORAL DAILY
Qty: 90 TABLET | Refills: 0 | Status: SHIPPED | OUTPATIENT
Start: 2024-01-04

## 2024-04-16 DIAGNOSIS — I10 BENIGN ESSENTIAL HYPERTENSION: ICD-10-CM

## 2024-04-16 RX ORDER — LOSARTAN POTASSIUM AND HYDROCHLOROTHIAZIDE 12.5; 5 MG/1; MG/1
1 TABLET ORAL DAILY
Qty: 90 TABLET | Refills: 0 | Status: SHIPPED | OUTPATIENT
Start: 2024-04-16

## 2024-06-24 ENCOUNTER — OFFICE VISIT (OUTPATIENT)
Age: 44
End: 2024-06-24
Payer: COMMERCIAL

## 2024-06-24 VITALS
TEMPERATURE: 98 F | OXYGEN SATURATION: 98 % | HEIGHT: 63 IN | SYSTOLIC BLOOD PRESSURE: 110 MMHG | DIASTOLIC BLOOD PRESSURE: 70 MMHG | HEART RATE: 81 BPM | BODY MASS INDEX: 33.81 KG/M2 | WEIGHT: 190.8 LBS

## 2024-06-24 DIAGNOSIS — E78.5 HYPERLIPIDEMIA, UNSPECIFIED HYPERLIPIDEMIA TYPE: ICD-10-CM

## 2024-06-24 DIAGNOSIS — F90.9 ATTENTION DEFICIT HYPERACTIVITY DISORDER (ADHD), UNSPECIFIED ADHD TYPE: ICD-10-CM

## 2024-06-24 DIAGNOSIS — G56.01 CARPAL TUNNEL SYNDROME OF RIGHT WRIST: Primary | ICD-10-CM

## 2024-06-24 DIAGNOSIS — I10 BENIGN ESSENTIAL HYPERTENSION: ICD-10-CM

## 2024-06-24 DIAGNOSIS — E66.9 OBESITY (BMI 30.0-34.9): ICD-10-CM

## 2024-06-24 DIAGNOSIS — E78.1 HYPERTRIGLYCERIDEMIA: ICD-10-CM

## 2024-06-24 DIAGNOSIS — F41.9 ANXIETY: ICD-10-CM

## 2024-06-24 DIAGNOSIS — Z13.228 SCREENING FOR METABOLIC DISORDER: ICD-10-CM

## 2024-06-24 DIAGNOSIS — E55.9 VITAMIN D DEFICIENCY: ICD-10-CM

## 2024-06-24 PROCEDURE — 99214 OFFICE O/P EST MOD 30 MIN: CPT | Performed by: NURSE PRACTITIONER

## 2024-06-24 RX ORDER — LISDEXAMFETAMINE DIMESYLATE 30 MG/1
30 CAPSULE ORAL EVERY MORNING
Qty: 30 CAPSULE | Refills: 0 | Status: SHIPPED | OUTPATIENT
Start: 2024-06-24

## 2024-06-24 RX ORDER — LOSARTAN POTASSIUM 50 MG/1
50 TABLET ORAL DAILY
Qty: 90 TABLET | Refills: 1 | Status: SHIPPED | OUTPATIENT
Start: 2024-06-24

## 2024-06-24 NOTE — PROGRESS NOTES
Assessment/Plan:    Benign essential hypertension  -Blood pressure on the softer side, continue losartan and discontinue hydrochlorothiazide    Anxiety  Continue with Ativan as needed    Vitamin D deficiency  Continue with supplementation    Obesity (BMI 30.0-34.9)  -Encouraged dietary changes and increased exercise    Hypertriglyceridemia  Recommend healthy lifestyle choices for your cholesterol.  Low fat/low cholesterol diet.  Limit/avoid red meat.  Eat more lean meat - chicken breast, ground turkey, fish.  Exercise 30 mins at least 5 times a week as tolerated.        Attention deficit hyperactivity disorder (ADHD)  -ADHD screening positive while in office today  -Controlled substance agreement on file  -Will start Vyvanse 30 mg tablet daily, follow-up in 1 month or sooner if needed    Carpal tunnel syndrome of right wrist  -Referral to orthopedics  -Will get EMG  -Recommend wrist splint and anti-inflammatory as needed              Diagnoses and all orders for this visit:    Carpal tunnel syndrome of right wrist  -     EMG 2 Limb Upper Extremity; Future  -     Ambulatory Referral to Orthopedic Surgery; Future    Benign essential hypertension  -     losartan (COZAAR) 50 mg tablet; Take 1 tablet (50 mg total) by mouth daily    Attention deficit hyperactivity disorder (ADHD), unspecified ADHD type  -     lisdexamfetamine (Vyvanse) 30 MG capsule; Take 1 capsule (30 mg total) by mouth every morning Max Daily Amount: 30 mg    Screening for metabolic disorder  -     Comprehensive metabolic panel; Future  -     CBC and differential  -     Lipid panel  -     Comprehensive metabolic panel    Hyperlipidemia, unspecified hyperlipidemia type  -     Lipid panel    Anxiety    Vitamin D deficiency    Obesity (BMI 30.0-34.9)    Hypertriglyceridemia          Subjective:      Patient ID: Kandis Puga is a 44 y.o. female.    Patient present today to follow up on blood work and HTN.  Denies any new concerns    HTN- has been  monitoring at home, -137, DBP 92-95, denies any chest pain, headaches or changes in vision, she reports a mild dry cough, she continues on losartan-hctz  BP on the softer side at last two office visits     Anxiety-continues with lorazepam as needed, uses very sparingly    HLD- ASVCD 1.4%    She does a lot of computer work, she reports content numbness to her thumb, pinky and ring finger on her right side, she reports reduced      She feels that she may have ADHD, she feels like she has a racing mind, can not stay on task, feels disorganized, started a few years ago but has gotten worse          The following portions of the patient's history were reviewed and updated as appropriate: allergies, current medications, past family history, past medical history, past social history, past surgical history, and problem list.    Review of Systems   Constitutional:  Negative for activity change, appetite change, chills, diaphoresis and fever.   HENT:  Negative for congestion, ear discharge, ear pain, postnasal drip, rhinorrhea, sinus pressure, sinus pain and sore throat.    Eyes:  Negative for pain, discharge, itching and visual disturbance.   Respiratory:  Negative for cough, chest tightness, shortness of breath and wheezing.    Cardiovascular:  Negative for chest pain, palpitations and leg swelling.   Gastrointestinal:  Negative for abdominal pain, constipation, diarrhea, nausea and vomiting.   Endocrine: Negative for polydipsia, polyphagia and polyuria.   Genitourinary:  Negative for difficulty urinating, dysuria and urgency.   Musculoskeletal:  Negative for arthralgias, back pain and neck pain.   Skin:  Negative for rash and wound.   Neurological:  Negative for dizziness, weakness, numbness and headaches.   Psychiatric/Behavioral:  Positive for decreased concentration. Negative for self-injury and sleep disturbance. The patient is not nervous/anxious.          Past Medical History:   Diagnosis Date    Anxiety      BRCA1 negative     BRCA2 negative     Chronic pain disorder 2017    Depression     Depression, major, recurrent, moderate (HCC) 2017    Facial contusion 2019    History of migraine headaches     PTSD (post-traumatic stress disorder)     URTI (acute upper respiratory infection) 2014    Weight gain 2017         Current Outpatient Medications:     albuterol (PROVENTIL HFA,VENTOLIN HFA) 90 mcg/act inhaler, Inhale 2 puffs every 6 (six) hours as needed for wheezing, Disp: 1 Inhaler, Rfl: 0    b complex vitamins capsule, Take 1 capsule by mouth daily, Disp: , Rfl:     cholecalciferol (VITAMIN D3) 1,000 units tablet, Take 1,000 Units by mouth daily, Disp: , Rfl:     lisdexamfetamine (Vyvanse) 30 MG capsule, Take 1 capsule (30 mg total) by mouth every morning Max Daily Amount: 30 mg, Disp: 30 capsule, Rfl: 0    LORazepam (ATIVAN) 0.5 mg tablet, Take 1 tablet (0.5 mg total) by mouth every 8 (eight) hours as needed for anxiety, Disp: 10 tablet, Rfl: 0    losartan (COZAAR) 50 mg tablet, Take 1 tablet (50 mg total) by mouth daily, Disp: 90 tablet, Rfl: 1    Misc Natural Products (Osteo Bi-Flex Adv Triple St) TABS, Take 2 tablets by mouth in the morning, Disp: , Rfl:     Multiple Vitamins-Minerals (MULTIVITAMIN ADULT PO), Take 1 tablet by mouth, Disp: , Rfl:     Omega-3 Fatty Acids (FISH OIL PO), Take 1,250 mg by mouth daily, Disp: , Rfl:     Allergies   Allergen Reactions    Clindamycin Anaphylaxis     Anaphylaxis    Other      Seasonal Allergies, Bee Stings    Strawberry Extract - Food Allergy        Social History   Past Surgical History:   Procedure Laterality Date    BREAST SURGERY      breat reduction     SECTION  2015    PARATHYROIDECTOMY  2002    REDUCTION MAMMAPLASTY Bilateral 2018    TONSILLECTOMY  2000     Family History   Problem Relation Age of Onset    No Known Problems Mother     Diabetes Father     Stroke Father     COPD Father     Hyperlipidemia Father      "Hypertension Father     No Known Problems Maternal Grandmother     No Known Problems Maternal Grandfather     No Known Problems Paternal Grandmother     No Known Problems Paternal Grandfather     No Known Problems Son     No Known Problems Son     No Known Problems Maternal Uncle     No Known Problems Paternal Aunt     No Known Problems Paternal Uncle     Breast cancer Cousin 27        4 rounds Breast cancer    Breast cancer Half-Sister 47       Objective:  /70 (BP Location: Left arm, Patient Position: Sitting, Cuff Size: Standard)   Pulse 81   Temp 98 °F (36.7 °C) (Temporal)   Ht 5' 3.23\" (1.606 m)   Wt 86.5 kg (190 lb 12.8 oz)   SpO2 98%   BMI 33.56 kg/m²     No results found for this or any previous visit (from the past 1344 hour(s)).         Physical Exam  Constitutional:       General: She is not in acute distress.     Appearance: She is well-developed. She is not diaphoretic.   HENT:      Head: Normocephalic and atraumatic.      Right Ear: External ear normal.      Left Ear: External ear normal.      Nose: Nose normal.      Mouth/Throat:      Mouth: Mucous membranes are moist.      Pharynx: No oropharyngeal exudate or posterior oropharyngeal erythema.   Eyes:      General:         Right eye: No discharge.         Left eye: No discharge.      Conjunctiva/sclera: Conjunctivae normal.      Pupils: Pupils are equal, round, and reactive to light.   Neck:      Thyroid: No thyromegaly.   Cardiovascular:      Rate and Rhythm: Normal rate and regular rhythm.      Heart sounds: Normal heart sounds. No murmur heard.     No friction rub. No gallop.   Pulmonary:      Effort: Pulmonary effort is normal. No respiratory distress.      Breath sounds: Normal breath sounds. No stridor. No wheezing or rales.   Abdominal:      General: Bowel sounds are normal. There is no distension.      Palpations: Abdomen is soft.      Tenderness: There is no abdominal tenderness.   Musculoskeletal:      Cervical back: Normal range " of motion and neck supple.   Lymphadenopathy:      Cervical: No cervical adenopathy.   Skin:     General: Skin is warm and dry.      Findings: No erythema or rash.   Neurological:      Mental Status: She is alert and oriented to person, place, and time.   Psychiatric:         Behavior: Behavior normal.         Thought Content: Thought content normal.         Judgment: Judgment normal.

## 2024-06-24 NOTE — ASSESSMENT & PLAN NOTE
Called to schedule first available new patient  appt. No answer. Left message to call back. Continue with supplementation

## 2024-06-24 NOTE — ASSESSMENT & PLAN NOTE
-ADHD screening positive while in office today  -Controlled substance agreement on file  -Will start Vyvanse 30 mg tablet daily, follow-up in 1 month or sooner if needed

## 2024-07-15 ENCOUNTER — OFFICE VISIT (OUTPATIENT)
Dept: OBGYN CLINIC | Facility: CLINIC | Age: 44
End: 2024-07-15
Payer: COMMERCIAL

## 2024-07-15 VITALS
SYSTOLIC BLOOD PRESSURE: 116 MMHG | BODY MASS INDEX: 32.44 KG/M2 | HEIGHT: 64 IN | WEIGHT: 190 LBS | DIASTOLIC BLOOD PRESSURE: 70 MMHG

## 2024-07-15 DIAGNOSIS — G56.01 CARPAL TUNNEL SYNDROME OF RIGHT WRIST: ICD-10-CM

## 2024-07-15 DIAGNOSIS — G56.03 CARPAL TUNNEL SYNDROME, BILATERAL: Primary | ICD-10-CM

## 2024-07-15 PROCEDURE — 99203 OFFICE O/P NEW LOW 30 MIN: CPT | Performed by: SURGERY

## 2024-07-15 NOTE — PROGRESS NOTES
Assessment    Bilateral hand numbness, likely carpal and cubital tunnel syndrome     Plan    Initiate treatment with nighttime bracing.  Velcro wrist braces dispensed in the office today.  Soft elbow extension splinting also recommended.  Activities as tolerated.  EMG B/L UE already ordered.  Follow-up 6 weeks for re-evaluation of symptoms.        Subjective     HPI    Patient ID:  Kandis Puga is a right hand dominant 44 y.o. female   Here for evaluation of the bilateral hands.  According to the patient, she has a 6-month history of right greater than left numbness and tingling sensation mainly in the thumb index and small fingers that affect the right side much more than the left.  She states there is no injury or trauma that started this.  Symptoms are typically worse at night and in the morning.  She has not had any formal treatment for it thus far.  This is starting to bother her more so on a daily basis so she explained this to her PCP who ordered EMG of the upper extremities and referred her here for further evaluation.    The following portions of the patient's history were reviewed and updated as appropriate: allergies, current medications, past family history, past medical history, past social history, past surgical history, and problem list.    Review of Systems     Objective    Imaging:  None.  EMG B/L UE ordered by PCP.    Physical Exam     Vitals:    07/15/24 1110   BP: 116/70     General appearance:  NAD   Cardiac:  Regular rate  Lungs:  Unlabored breathing  Abdomen:  Non-distended    Orthopedic Examination:  Bilateral hands     Inspection: No open wounds or erythema.  No ecchymosis or swelling.  No muscle wasting.    Palpation: Nontender bony prominences of the hand and wrist.    Range-of-motion: Normal elbow wrist range of motion, full composite fist    Strength: 5/5 wrist flexion extension, , intrinsics, thumb opposition, APB    Sensation: Intact to light touch median radial ulnar nerve  distribution  2-point discrimination testing throughout bilateral hands    Special Tests: Positive Tinel's and Durkan's compression bilateral wrist  Positive Tinel's at the medial elbow bilaterally  The upper extremities are warm and well-perfused  Palpable radial pulse

## 2024-07-23 ENCOUNTER — OFFICE VISIT (OUTPATIENT)
Age: 44
End: 2024-07-23
Payer: COMMERCIAL

## 2024-07-23 VITALS
HEIGHT: 63 IN | DIASTOLIC BLOOD PRESSURE: 86 MMHG | TEMPERATURE: 98 F | SYSTOLIC BLOOD PRESSURE: 114 MMHG | OXYGEN SATURATION: 99 % | HEART RATE: 90 BPM | WEIGHT: 189.6 LBS | BODY MASS INDEX: 33.59 KG/M2

## 2024-07-23 DIAGNOSIS — F90.2 ATTENTION DEFICIT HYPERACTIVITY DISORDER (ADHD), COMBINED TYPE: Primary | ICD-10-CM

## 2024-07-23 DIAGNOSIS — Z79.899 MEDICATION MANAGEMENT: ICD-10-CM

## 2024-07-23 DIAGNOSIS — Z12.31 ENCOUNTER FOR SCREENING MAMMOGRAM FOR BREAST CANCER: ICD-10-CM

## 2024-07-23 PROCEDURE — 99213 OFFICE O/P EST LOW 20 MIN: CPT | Performed by: NURSE PRACTITIONER

## 2024-07-23 PROCEDURE — 93000 ELECTROCARDIOGRAM COMPLETE: CPT | Performed by: NURSE PRACTITIONER

## 2024-07-23 RX ORDER — DEXTROAMPHETAMINE SACCHARATE, AMPHETAMINE ASPARTATE MONOHYDRATE, DEXTROAMPHETAMINE SULFATE AND AMPHETAMINE SULFATE 2.5; 2.5; 2.5; 2.5 MG/1; MG/1; MG/1; MG/1
10 CAPSULE, EXTENDED RELEASE ORAL EVERY MORNING
Qty: 30 CAPSULE | Refills: 0 | Status: SHIPPED | OUTPATIENT
Start: 2024-07-23

## 2024-07-23 NOTE — ASSESSMENT & PLAN NOTE
-ADHD screening positive  -Reviewed EKG while in the office today   -Controlled substance agreement on file  -Will start Adderall XR 10mg, follow-up in 1 month or sooner if needed

## 2024-07-23 NOTE — PROGRESS NOTES
Assessment/Plan:    Attention deficit hyperactivity disorder (ADHD)  -ADHD screening positive  -Reviewed EKG while in the office today   -Controlled substance agreement on file  -Will start Adderall XR 10mg, follow-up in 1 month or sooner if needed              Diagnoses and all orders for this visit:    Attention deficit hyperactivity disorder (ADHD), combined type  -     amphetamine-dextroamphetamine (ADDERALL XR) 10 MG 24 hr capsule; Take 1 capsule (10 mg total) by mouth every morning Max Daily Amount: 10 mg    Encounter for screening mammogram for breast cancer          Subjective:      Patient ID: Kandis Puga is a 44 y.o. female.    Patient presents today to follow-up on ADHD.  Blood work not done prior to follow-up today    ADHD-adult ADHD self-report scale was performed at last office visit, patient was started on Vyvanse however did not start this medication as there was a $270 a month co-pay,  She feels that she may have ADHD, she feels like she has a racing mind, can not stay on task, feels disorganized, started a few years ago but has gotten worse            The following portions of the patient's history were reviewed and updated as appropriate: allergies, current medications, past family history, past medical history, past social history, past surgical history, and problem list.    Review of Systems   Constitutional:  Negative for activity change, appetite change, chills, diaphoresis and fever.   HENT:  Negative for congestion, ear discharge, ear pain, postnasal drip, rhinorrhea, sinus pressure, sinus pain and sore throat.    Eyes:  Negative for pain, discharge, itching and visual disturbance.   Respiratory:  Negative for cough, chest tightness, shortness of breath and wheezing.    Cardiovascular:  Negative for chest pain, palpitations and leg swelling.   Gastrointestinal:  Negative for abdominal pain, constipation, diarrhea, nausea and vomiting.   Endocrine: Negative for polydipsia, polyphagia  and polyuria.   Genitourinary:  Negative for difficulty urinating, dysuria and urgency.   Musculoskeletal:  Negative for arthralgias, back pain and neck pain.   Skin:  Negative for rash and wound.   Neurological:  Negative for dizziness, weakness, numbness and headaches.   Psychiatric/Behavioral:  Positive for decreased concentration. Negative for dysphoric mood. The patient is nervous/anxious.          Past Medical History:   Diagnosis Date    Anxiety     BRCA1 negative     BRCA2 negative     Chronic pain disorder 11/01/2017    Depression     Depression, major, recurrent, moderate (HCC) 11/01/2017    Facial contusion 11/25/2019    History of migraine headaches     PTSD (post-traumatic stress disorder)     URTI (acute upper respiratory infection) 09/09/2014    Weight gain 01/03/2017         Current Outpatient Medications:     albuterol (PROVENTIL HFA,VENTOLIN HFA) 90 mcg/act inhaler, Inhale 2 puffs every 6 (six) hours as needed for wheezing, Disp: 1 Inhaler, Rfl: 0    amphetamine-dextroamphetamine (ADDERALL XR) 10 MG 24 hr capsule, Take 1 capsule (10 mg total) by mouth every morning Max Daily Amount: 10 mg, Disp: 30 capsule, Rfl: 0    b complex vitamins capsule, Take 1 capsule by mouth daily, Disp: , Rfl:     cholecalciferol (VITAMIN D3) 1,000 units tablet, Take 1,000 Units by mouth daily, Disp: , Rfl:     LORazepam (ATIVAN) 0.5 mg tablet, Take 1 tablet (0.5 mg total) by mouth every 8 (eight) hours as needed for anxiety, Disp: 10 tablet, Rfl: 0    losartan (COZAAR) 50 mg tablet, Take 1 tablet (50 mg total) by mouth daily, Disp: 90 tablet, Rfl: 1    Misc Natural Products (Osteo Bi-Flex Adv Triple St) TABS, Take 2 tablets by mouth in the morning, Disp: , Rfl:     Multiple Vitamins-Minerals (MULTIVITAMIN ADULT PO), Take 1 tablet by mouth, Disp: , Rfl:     Omega-3 Fatty Acids (FISH OIL PO), Take 1,250 mg by mouth daily, Disp: , Rfl:     Allergies   Allergen Reactions    Clindamycin Anaphylaxis     Anaphylaxis    Other   "    Seasonal Allergies, Bee Stings    Strawberry Extract - Food Allergy        Social History   Past Surgical History:   Procedure Laterality Date    BREAST SURGERY  2008    breat reduction     SECTION  2015    PARATHYROIDECTOMY  2002    REDUCTION MAMMAPLASTY Bilateral 2018    TONSILLECTOMY  2000     Family History   Problem Relation Age of Onset    No Known Problems Mother     Diabetes Father     Stroke Father     COPD Father     Hyperlipidemia Father     Hypertension Father     No Known Problems Maternal Grandmother     No Known Problems Maternal Grandfather     No Known Problems Paternal Grandmother     No Known Problems Paternal Grandfather     No Known Problems Son     No Known Problems Son     No Known Problems Maternal Uncle     No Known Problems Paternal Aunt     No Known Problems Paternal Uncle     Breast cancer Cousin 27        4 rounds Breast cancer    Breast cancer Half-Sister 47       Objective:  /86 (BP Location: Left arm, Patient Position: Sitting, Cuff Size: Standard)   Pulse 90   Temp 98 °F (36.7 °C) (Temporal)   Ht 5' 3.39\" (1.61 m)   Wt 86 kg (189 lb 9.6 oz)   LMP  (LMP Unknown)   SpO2 99%   BMI 33.18 kg/m²              Physical Exam  Constitutional:       General: She is not in acute distress.     Appearance: She is well-developed. She is not diaphoretic.   HENT:      Head: Normocephalic and atraumatic.      Right Ear: External ear normal.      Left Ear: External ear normal.      Nose: Nose normal.      Mouth/Throat:      Mouth: Mucous membranes are moist.      Pharynx: No oropharyngeal exudate or posterior oropharyngeal erythema.   Eyes:      General:         Right eye: No discharge.         Left eye: No discharge.      Conjunctiva/sclera: Conjunctivae normal.      Pupils: Pupils are equal, round, and reactive to light.   Neck:      Thyroid: No thyromegaly.   Cardiovascular:      Rate and Rhythm: Normal rate and regular rhythm.      Heart sounds: Normal heart sounds. No " murmur heard.     No friction rub. No gallop.   Pulmonary:      Effort: Pulmonary effort is normal. No respiratory distress.      Breath sounds: Normal breath sounds. No stridor. No wheezing or rales.   Abdominal:      General: Bowel sounds are normal. There is no distension.      Palpations: Abdomen is soft.      Tenderness: There is no abdominal tenderness.   Musculoskeletal:      Cervical back: Normal range of motion and neck supple.   Lymphadenopathy:      Cervical: No cervical adenopathy.   Skin:     General: Skin is warm and dry.      Findings: No erythema or rash.   Neurological:      Mental Status: She is alert and oriented to person, place, and time.   Psychiatric:         Behavior: Behavior normal.         Thought Content: Thought content normal.         Judgment: Judgment normal.

## 2024-09-04 ENCOUNTER — APPOINTMENT (OUTPATIENT)
Dept: LAB | Age: 44
End: 2024-09-04
Payer: COMMERCIAL

## 2024-09-04 DIAGNOSIS — E88.810 METABOLIC SYNDROME: Primary | ICD-10-CM

## 2024-09-04 LAB
ALBUMIN SERPL BCG-MCNC: 4 G/DL (ref 3.5–5)
ALP SERPL-CCNC: 118 U/L (ref 34–104)
ALT SERPL W P-5'-P-CCNC: 20 U/L (ref 7–52)
ANION GAP SERPL CALCULATED.3IONS-SCNC: 10 MMOL/L (ref 4–13)
AST SERPL W P-5'-P-CCNC: 18 U/L (ref 13–39)
BASOPHILS # BLD AUTO: 0.06 THOUSANDS/ÂΜL (ref 0–0.1)
BASOPHILS NFR BLD AUTO: 1 % (ref 0–1)
BILIRUB SERPL-MCNC: 1.03 MG/DL (ref 0.2–1)
BUN SERPL-MCNC: 10 MG/DL (ref 5–25)
CALCIUM SERPL-MCNC: 9 MG/DL (ref 8.4–10.2)
CHLORIDE SERPL-SCNC: 105 MMOL/L (ref 96–108)
CHOLEST SERPL-MCNC: 144 MG/DL
CO2 SERPL-SCNC: 25 MMOL/L (ref 21–32)
CREAT SERPL-MCNC: 0.59 MG/DL (ref 0.6–1.3)
EOSINOPHIL # BLD AUTO: 0.42 THOUSAND/ÂΜL (ref 0–0.61)
EOSINOPHIL NFR BLD AUTO: 6 % (ref 0–6)
ERYTHROCYTE [DISTWIDTH] IN BLOOD BY AUTOMATED COUNT: 12.1 % (ref 11.6–15.1)
GFR SERPL CREATININE-BSD FRML MDRD: 113 ML/MIN/1.73SQ M
GLUCOSE P FAST SERPL-MCNC: 82 MG/DL (ref 65–99)
HCT VFR BLD AUTO: 40.8 % (ref 34.8–46.1)
HDLC SERPL-MCNC: 32 MG/DL
HGB BLD-MCNC: 14 G/DL (ref 11.5–15.4)
IMM GRANULOCYTES # BLD AUTO: 0.02 THOUSAND/UL (ref 0–0.2)
IMM GRANULOCYTES NFR BLD AUTO: 0 % (ref 0–2)
LDLC SERPL CALC-MCNC: 79 MG/DL (ref 0–100)
LYMPHOCYTES # BLD AUTO: 2.67 THOUSANDS/ÂΜL (ref 0.6–4.47)
LYMPHOCYTES NFR BLD AUTO: 39 % (ref 14–44)
MCH RBC QN AUTO: 30.4 PG (ref 26.8–34.3)
MCHC RBC AUTO-ENTMCNC: 34.3 G/DL (ref 31.4–37.4)
MCV RBC AUTO: 89 FL (ref 82–98)
MONOCYTES # BLD AUTO: 0.51 THOUSAND/ÂΜL (ref 0.17–1.22)
MONOCYTES NFR BLD AUTO: 8 % (ref 4–12)
NEUTROPHILS # BLD AUTO: 3.13 THOUSANDS/ÂΜL (ref 1.85–7.62)
NEUTS SEG NFR BLD AUTO: 46 % (ref 43–75)
NONHDLC SERPL-MCNC: 112 MG/DL
NRBC BLD AUTO-RTO: 0 /100 WBCS
PLATELET # BLD AUTO: 316 THOUSANDS/UL (ref 149–390)
PMV BLD AUTO: 10.5 FL (ref 8.9–12.7)
POTASSIUM SERPL-SCNC: 4 MMOL/L (ref 3.5–5.3)
PROT SERPL-MCNC: 6.3 G/DL (ref 6.4–8.4)
RBC # BLD AUTO: 4.6 MILLION/UL (ref 3.81–5.12)
SODIUM SERPL-SCNC: 140 MMOL/L (ref 135–147)
TRIGL SERPL-MCNC: 166 MG/DL
WBC # BLD AUTO: 6.81 THOUSAND/UL (ref 4.31–10.16)

## 2024-09-04 PROCEDURE — 85025 COMPLETE CBC W/AUTO DIFF WBC: CPT

## 2024-09-04 PROCEDURE — 80053 COMPREHEN METABOLIC PANEL: CPT

## 2024-09-04 PROCEDURE — 36415 COLL VENOUS BLD VENIPUNCTURE: CPT

## 2024-09-04 PROCEDURE — 80061 LIPID PANEL: CPT

## 2024-09-06 ENCOUNTER — OFFICE VISIT (OUTPATIENT)
Age: 44
End: 2024-09-06
Payer: COMMERCIAL

## 2024-09-06 VITALS
TEMPERATURE: 98.1 F | WEIGHT: 191 LBS | HEIGHT: 63 IN | OXYGEN SATURATION: 99 % | BODY MASS INDEX: 33.84 KG/M2 | SYSTOLIC BLOOD PRESSURE: 122 MMHG | DIASTOLIC BLOOD PRESSURE: 86 MMHG | HEART RATE: 67 BPM

## 2024-09-06 DIAGNOSIS — B96.89 ACUTE BACTERIAL BRONCHITIS: ICD-10-CM

## 2024-09-06 DIAGNOSIS — F90.2 ATTENTION DEFICIT HYPERACTIVITY DISORDER (ADHD), COMBINED TYPE: ICD-10-CM

## 2024-09-06 DIAGNOSIS — Z12.31 ENCOUNTER FOR SCREENING MAMMOGRAM FOR BREAST CANCER: ICD-10-CM

## 2024-09-06 DIAGNOSIS — E78.1 HYPERTRIGLYCERIDEMIA: ICD-10-CM

## 2024-09-06 DIAGNOSIS — J20.8 ACUTE BACTERIAL BRONCHITIS: ICD-10-CM

## 2024-09-06 DIAGNOSIS — I10 BENIGN ESSENTIAL HYPERTENSION: Primary | ICD-10-CM

## 2024-09-06 PROCEDURE — 99213 OFFICE O/P EST LOW 20 MIN: CPT | Performed by: NURSE PRACTITIONER

## 2024-09-06 RX ORDER — DEXTROAMPHETAMINE SACCHARATE, AMPHETAMINE ASPARTATE MONOHYDRATE, DEXTROAMPHETAMINE SULFATE AND AMPHETAMINE SULFATE 2.5; 2.5; 2.5; 2.5 MG/1; MG/1; MG/1; MG/1
10 CAPSULE, EXTENDED RELEASE ORAL EVERY MORNING
Qty: 30 CAPSULE | Refills: 0 | Status: SHIPPED | OUTPATIENT
Start: 2024-09-06

## 2024-09-06 NOTE — ASSESSMENT & PLAN NOTE
-ADHD screening positive  -Reviewed EKG while in the office today   -Controlled substance agreement on file  -Continue Adderall XR 10mg, follow-up in 4 month or sooner if needed

## 2024-09-06 NOTE — PROGRESS NOTES
Assessment/Plan:    Benign essential hypertension  -Blood pressure well-controlled on losartan    Attention deficit hyperactivity disorder (ADHD)  -ADHD screening positive  -Reviewed EKG while in the office today   -Controlled substance agreement on file  -Continue Adderall XR 10mg, follow-up in 4 month or sooner if needed    Hypertriglyceridemia  Recommend healthy lifestyle choices for your cholesterol.  Low fat/low cholesterol diet.  Limit/avoid red meat.  Eat more lean meat - chicken breast, ground turkey, fish.  Exercise 30 mins at least 5 times a week as tolerated.    Continue fish oil     BMI Counseling: Body mass index is 33.67 kg/m². The BMI is above normal. Nutrition recommendations include reducing portion sizes, decreasing overall calorie intake, 3-5 servings of fruits/vegetables daily, and reducing fast food intake. Exercise recommendations include moderate aerobic physical activity for 150 minutes/week.    BMI Counseling: Body mass index is 33.67 kg/m². The BMI is above normal. Exercise recommendations include exercising 3-5 times per week.            Diagnoses and all orders for this visit:    Benign essential hypertension    Encounter for screening mammogram for breast cancer  -     Mammo screening bilateral w 3d and cad; Future    Acute bacterial bronchitis    Attention deficit hyperactivity disorder (ADHD), combined type  -     amphetamine-dextroamphetamine (ADDERALL XR) 10 MG 24 hr capsule; Take 1 capsule (10 mg total) by mouth every morning Max Daily Amount: 10 mg    Hypertriglyceridemia          Subjective:      Patient ID: Kandis Puga is a 44 y.o. female.    Patient presents today to follow-up on ADHD.  Blood work reviewed will in the office today     ADHD-adult ADHD self-report scale was performed at last office visit, patient was started on Vyvanse however did not start this medication as there was a $270 a month co-pay,  She feels that she may have ADHD, she feels like she has a racing  mind, can not stay on task, feels disorganized, started a few years ago but has gotten worse    She was started on Adderall 10 mg extended release at last office visit  She feels much more controlled on this medication, denies any side effects, appetite has been good     Patient noted to have mildly elevated triglycerides at 166, HDL 32, LDL 79, cholesterol 144          The following portions of the patient's history were reviewed and updated as appropriate: allergies, current medications, past family history, past medical history, past social history, past surgical history, and problem list.    Review of Systems   Constitutional:  Negative for activity change, appetite change, chills, diaphoresis and fever.   HENT:  Negative for congestion, ear discharge, ear pain, postnasal drip, rhinorrhea, sinus pressure, sinus pain and sore throat.    Eyes:  Negative for pain, discharge, itching and visual disturbance.   Respiratory:  Negative for cough, chest tightness, shortness of breath and wheezing.    Cardiovascular:  Negative for chest pain, palpitations and leg swelling.   Gastrointestinal:  Negative for abdominal pain, constipation, diarrhea, nausea and vomiting.   Endocrine: Negative for polydipsia, polyphagia and polyuria.   Genitourinary:  Negative for difficulty urinating, dysuria and urgency.   Musculoskeletal:  Negative for arthralgias, back pain and neck pain.   Skin:  Negative for rash and wound.   Neurological:  Negative for dizziness, weakness, numbness and headaches.         Past Medical History:   Diagnosis Date    Anxiety     BRCA1 negative     BRCA2 negative     Chronic pain disorder 11/01/2017    Depression     Depression, major, recurrent, moderate (HCC) 11/01/2017    Facial contusion 11/25/2019    History of migraine headaches     PTSD (post-traumatic stress disorder)     URTI (acute upper respiratory infection) 09/09/2014    Weight gain 01/03/2017         Current Outpatient Medications:     albuterol  (PROVENTIL HFA,VENTOLIN HFA) 90 mcg/act inhaler, Inhale 2 puffs every 6 (six) hours as needed for wheezing, Disp: 1 Inhaler, Rfl: 0    amphetamine-dextroamphetamine (ADDERALL XR) 10 MG 24 hr capsule, Take 1 capsule (10 mg total) by mouth every morning Max Daily Amount: 10 mg, Disp: 30 capsule, Rfl: 0    b complex vitamins capsule, Take 1 capsule by mouth daily, Disp: , Rfl:     cholecalciferol (VITAMIN D3) 1,000 units tablet, Take 1,000 Units by mouth daily, Disp: , Rfl:     LORazepam (ATIVAN) 0.5 mg tablet, Take 1 tablet (0.5 mg total) by mouth every 8 (eight) hours as needed for anxiety, Disp: 10 tablet, Rfl: 0    losartan (COZAAR) 50 mg tablet, Take 1 tablet (50 mg total) by mouth daily, Disp: 90 tablet, Rfl: 1    Misc Natural Products (Osteo Bi-Flex Adv Triple St) TABS, Take 2 tablets by mouth in the morning, Disp: , Rfl:     Multiple Vitamins-Minerals (MULTIVITAMIN ADULT PO), Take 1 tablet by mouth, Disp: , Rfl:     Omega-3 Fatty Acids (FISH OIL PO), Take 1,250 mg by mouth daily, Disp: , Rfl:     Allergies   Allergen Reactions    Clindamycin Anaphylaxis     Anaphylaxis    Other      Seasonal Allergies, Bee Stings    Strawberry Extract - Food Allergy        Social History   Past Surgical History:   Procedure Laterality Date    BREAST SURGERY      breat reduction     SECTION  2015    PARATHYROIDECTOMY  2002    REDUCTION MAMMAPLASTY Bilateral 2018    TONSILLECTOMY  2000     Family History   Problem Relation Age of Onset    No Known Problems Mother     Diabetes Father     Stroke Father     COPD Father     Hyperlipidemia Father     Hypertension Father     No Known Problems Maternal Grandmother     No Known Problems Maternal Grandfather     No Known Problems Paternal Grandmother     No Known Problems Paternal Grandfather     No Known Problems Son     No Known Problems Son     No Known Problems Maternal Uncle     No Known Problems Paternal Aunt     No Known Problems Paternal Uncle     Breast cancer  "Cousin 27        4 rounds Breast cancer    Breast cancer Half-Sister 47       Objective:  /86 (BP Location: Left arm, Patient Position: Sitting, Cuff Size: Standard)   Pulse 67   Temp 98.1 °F (36.7 °C) (Temporal)   Ht 5' 3.15\" (1.604 m)   Wt 86.6 kg (191 lb)   SpO2 99%   BMI 33.67 kg/m²              Physical Exam  Constitutional:       General: She is not in acute distress.     Appearance: She is well-developed. She is not diaphoretic.   HENT:      Head: Normocephalic and atraumatic.      Right Ear: External ear normal.      Left Ear: External ear normal.      Nose: Nose normal.      Mouth/Throat:      Mouth: Mucous membranes are moist.      Pharynx: No oropharyngeal exudate or posterior oropharyngeal erythema.   Eyes:      General:         Right eye: No discharge.         Left eye: No discharge.      Conjunctiva/sclera: Conjunctivae normal.      Pupils: Pupils are equal, round, and reactive to light.   Neck:      Thyroid: No thyromegaly.   Cardiovascular:      Rate and Rhythm: Normal rate and regular rhythm.      Heart sounds: Normal heart sounds. No murmur heard.     No friction rub. No gallop.   Pulmonary:      Effort: Pulmonary effort is normal. No respiratory distress.      Breath sounds: Normal breath sounds. No stridor. No wheezing or rales.   Abdominal:      General: Bowel sounds are normal. There is no distension.      Palpations: Abdomen is soft.      Tenderness: There is no abdominal tenderness.   Musculoskeletal:      Cervical back: Normal range of motion and neck supple.   Lymphadenopathy:      Cervical: No cervical adenopathy.   Skin:     General: Skin is warm and dry.      Findings: No erythema or rash.   Neurological:      Mental Status: She is alert and oriented to person, place, and time.   Psychiatric:         Behavior: Behavior normal.         Thought Content: Thought content normal.         Judgment: Judgment normal.         "

## 2024-09-06 NOTE — ASSESSMENT & PLAN NOTE
Recommend healthy lifestyle choices for your cholesterol.  Low fat/low cholesterol diet.  Limit/avoid red meat.  Eat more lean meat - chicken breast, ground turkey, fish.  Exercise 30 mins at least 5 times a week as tolerated.    Continue fish oil

## 2024-09-09 ENCOUNTER — OFFICE VISIT (OUTPATIENT)
Dept: OBGYN CLINIC | Facility: CLINIC | Age: 44
End: 2024-09-09
Payer: COMMERCIAL

## 2024-09-09 VITALS
HEIGHT: 63 IN | SYSTOLIC BLOOD PRESSURE: 120 MMHG | WEIGHT: 191 LBS | DIASTOLIC BLOOD PRESSURE: 80 MMHG | BODY MASS INDEX: 33.84 KG/M2

## 2024-09-09 DIAGNOSIS — R20.0 BILATERAL HAND NUMBNESS: Primary | ICD-10-CM

## 2024-09-09 PROCEDURE — 99213 OFFICE O/P EST LOW 20 MIN: CPT | Performed by: SURGERY

## 2024-09-09 NOTE — PROGRESS NOTES
Assessment    Bilateral hand numbness, likely carpal and cubital tunnel syndrome     Plan    Symptoms improved with activity modification and intermittent bracing.  At this point, would recommend continuing this.  Can still obtain previously scheduled EMG.  May follow-up PRN - call us for an appointment if symptoms return and persist.        Subjective     HPI    Patient ID:  Kandis Puga is a right hand dominant 44 y.o. female   Here for follow-up evaluation of the bilateral hands.  She states she feels about 75 to 80% improvement in symptoms since last visit.  She took 2 weeks off of work which helped immensely.  She still feels some numbness and tingling in the right thumb with activity related work like when she is at her computer.  She said it is difficult to brace throughout the night however she modifies this the best she can and it has helped.    Initial Hand H&P:  According to the patient, she has a 6-month history of right greater than left numbness and tingling sensation mainly in the thumb index and small fingers that affect the right side much more than the left.  She states there is no injury or trauma that started this.  Symptoms are typically worse at night and in the morning.  She has not had any formal treatment for it thus far.  This is starting to bother her more so on a daily basis so she explained this to her PCP who ordered EMG of the upper extremities and referred her here for further evaluation.    The following portions of the patient's history were reviewed and updated as appropriate: allergies, current medications, past family history, past medical history, past social history, past surgical history, and problem list.    Review of Systems     Objective    Imaging:  None.  EMG B/L UE ordered by PCP.    Physical Exam     Vitals:    09/09/24 1259   BP: 120/80     General appearance:  NAD   Cardiac:  Regular rate  Lungs:  Unlabored breathing  Abdomen:  Non-distended    Orthopedic  Examination:  Bilateral hands     Inspection: No open wounds or erythema.  No ecchymosis or swelling.  No muscle wasting.    Palpation: Nontender bony prominences of the hand and wrist.    Range-of-motion: Normal elbow wrist range of motion, full composite fist    Strength: 5/5 wrist flexion extension, , intrinsics, thumb opposition, APB    Sensation: Intact to light touch median radial ulnar nerve distribution  2-point discrimination testing throughout bilateral hands normal.    Special Tests: Positive Tinel's and Durkan's compression bilateral wrist  Positive Tinel's at the medial elbow bilaterally  The upper extremities are warm and well-perfused  Palpable radial pulse

## 2024-10-16 DIAGNOSIS — F90.2 ATTENTION DEFICIT HYPERACTIVITY DISORDER (ADHD), COMBINED TYPE: ICD-10-CM

## 2024-10-16 RX ORDER — DEXTROAMPHETAMINE SACCHARATE, AMPHETAMINE ASPARTATE MONOHYDRATE, DEXTROAMPHETAMINE SULFATE AND AMPHETAMINE SULFATE 2.5; 2.5; 2.5; 2.5 MG/1; MG/1; MG/1; MG/1
10 CAPSULE, EXTENDED RELEASE ORAL EVERY MORNING
Qty: 30 CAPSULE | Refills: 0 | Status: SHIPPED | OUTPATIENT
Start: 2024-10-16

## 2024-11-14 DIAGNOSIS — F90.2 ATTENTION DEFICIT HYPERACTIVITY DISORDER (ADHD), COMBINED TYPE: ICD-10-CM

## 2024-11-14 DIAGNOSIS — F41.9 ANXIETY: ICD-10-CM

## 2024-11-18 RX ORDER — LORAZEPAM 0.5 MG/1
0.5 TABLET ORAL EVERY 8 HOURS PRN
Qty: 10 TABLET | Refills: 0 | Status: SHIPPED | OUTPATIENT
Start: 2024-11-18

## 2024-11-18 RX ORDER — DEXTROAMPHETAMINE SACCHARATE, AMPHETAMINE ASPARTATE MONOHYDRATE, DEXTROAMPHETAMINE SULFATE AND AMPHETAMINE SULFATE 2.5; 2.5; 2.5; 2.5 MG/1; MG/1; MG/1; MG/1
10 CAPSULE, EXTENDED RELEASE ORAL EVERY MORNING
Qty: 30 CAPSULE | Refills: 0 | Status: SHIPPED | OUTPATIENT
Start: 2024-11-18

## 2024-12-06 ENCOUNTER — HOSPITAL ENCOUNTER (OUTPATIENT)
Dept: RADIOLOGY | Facility: IMAGING CENTER | Age: 44
End: 2024-12-06
Payer: COMMERCIAL

## 2024-12-06 VITALS — BODY MASS INDEX: 32.43 KG/M2 | WEIGHT: 183 LBS | HEIGHT: 63 IN

## 2024-12-06 DIAGNOSIS — Z12.31 ENCOUNTER FOR SCREENING MAMMOGRAM FOR BREAST CANCER: ICD-10-CM

## 2024-12-06 PROCEDURE — 77067 SCR MAMMO BI INCL CAD: CPT

## 2024-12-06 PROCEDURE — 77063 BREAST TOMOSYNTHESIS BI: CPT

## 2024-12-26 DIAGNOSIS — F90.2 ATTENTION DEFICIT HYPERACTIVITY DISORDER (ADHD), COMBINED TYPE: ICD-10-CM

## 2024-12-26 DIAGNOSIS — I10 BENIGN ESSENTIAL HYPERTENSION: ICD-10-CM

## 2024-12-26 RX ORDER — LOSARTAN POTASSIUM 50 MG/1
50 TABLET ORAL DAILY
Qty: 90 TABLET | Refills: 0 | Status: SHIPPED | OUTPATIENT
Start: 2024-12-26

## 2024-12-27 RX ORDER — DEXTROAMPHETAMINE SACCHARATE, AMPHETAMINE ASPARTATE MONOHYDRATE, DEXTROAMPHETAMINE SULFATE AND AMPHETAMINE SULFATE 2.5; 2.5; 2.5; 2.5 MG/1; MG/1; MG/1; MG/1
10 CAPSULE, EXTENDED RELEASE ORAL EVERY MORNING
Qty: 30 CAPSULE | Refills: 0 | Status: SHIPPED | OUTPATIENT
Start: 2024-12-27

## 2025-01-14 ENCOUNTER — OFFICE VISIT (OUTPATIENT)
Age: 45
End: 2025-01-14
Payer: COMMERCIAL

## 2025-01-14 VITALS
DIASTOLIC BLOOD PRESSURE: 84 MMHG | HEIGHT: 63 IN | WEIGHT: 188.8 LBS | TEMPERATURE: 97.2 F | HEART RATE: 74 BPM | BODY MASS INDEX: 33.45 KG/M2 | SYSTOLIC BLOOD PRESSURE: 116 MMHG | OXYGEN SATURATION: 98 %

## 2025-01-14 DIAGNOSIS — Z12.31 ENCOUNTER FOR SCREENING MAMMOGRAM FOR MALIGNANT NEOPLASM OF BREAST: Primary | ICD-10-CM

## 2025-01-14 DIAGNOSIS — F41.9 ANXIETY: ICD-10-CM

## 2025-01-14 DIAGNOSIS — I10 BENIGN ESSENTIAL HYPERTENSION: ICD-10-CM

## 2025-01-14 DIAGNOSIS — Z23 ENCOUNTER FOR IMMUNIZATION: ICD-10-CM

## 2025-01-14 DIAGNOSIS — E78.5 HYPERLIPIDEMIA, UNSPECIFIED HYPERLIPIDEMIA TYPE: ICD-10-CM

## 2025-01-14 DIAGNOSIS — F90.9 ATTENTION DEFICIT HYPERACTIVITY DISORDER (ADHD), UNSPECIFIED ADHD TYPE: ICD-10-CM

## 2025-01-14 DIAGNOSIS — Z13.228 SCREENING FOR METABOLIC DISORDER: ICD-10-CM

## 2025-01-14 DIAGNOSIS — E66.811 OBESITY (BMI 30.0-34.9): ICD-10-CM

## 2025-01-14 DIAGNOSIS — E78.1 HYPERTRIGLYCERIDEMIA: ICD-10-CM

## 2025-01-14 DIAGNOSIS — Z00.00 ANNUAL PHYSICAL EXAM: ICD-10-CM

## 2025-01-14 DIAGNOSIS — E55.9 VITAMIN D DEFICIENCY: ICD-10-CM

## 2025-01-14 PROCEDURE — 90656 IIV3 VACC NO PRSV 0.5 ML IM: CPT

## 2025-01-14 PROCEDURE — 99396 PREV VISIT EST AGE 40-64: CPT | Performed by: NURSE PRACTITIONER

## 2025-01-14 PROCEDURE — 90471 IMMUNIZATION ADMIN: CPT

## 2025-01-14 PROCEDURE — 99214 OFFICE O/P EST MOD 30 MIN: CPT | Performed by: NURSE PRACTITIONER

## 2025-01-14 NOTE — ASSESSMENT & PLAN NOTE
-Up-to-date with fasting blood work  -Continue with well-balanced diet, encouraged daily exercise  -She is up-to-date with mammogram, she is due for cervical cancer screening however she does follow [facility] and she is scheduled in March for follow up   -Received influenza vaccination while in office today  -Encourage monthly self breast examination  -Follow-up in 1 year for annual physical or sooner if needed    Orders:    CBC and differential    Comprehensive metabolic panel; Future    Lipid panel

## 2025-01-14 NOTE — ASSESSMENT & PLAN NOTE
-ADHD screening positive  -Reviewed EKG while in the office today   -Controlled substance agreement on file  -Continue Adderall XR 10mg, follow-up in 6 month or sooner if needed

## 2025-01-14 NOTE — PROGRESS NOTES
Adult Annual Physical  Name: Kandis Puga      : 1980      MRN: [id number]  Encounter Provider: RYAN Durán  Encounter Date: 2025   Encounter department: [facility] [place]    Assessment & Plan  Encounter for screening mammogram for malignant neoplasm of breast    Orders:    Mammo screening bilateral w 3d and cad; Future    Screening for metabolic disorder    Orders:    CBC and differential    Comprehensive metabolic panel; Future    Lipid panel    Hyperlipidemia, unspecified hyperlipidemia type    Orders:    CBC and differential    Comprehensive metabolic panel; Future    Lipid panel    Annual physical exam  -Up-to-date with fasting blood work  -Continue with well-balanced diet, encouraged daily exercise  -She is up-to-date with mammogram, she is due for cervical cancer screening however she does follow [facility] and she is scheduled in March for follow up   -Received influenza vaccination while in office today  -Encourage monthly self breast examination  -Follow-up in 1 year for annual physical or sooner if needed    Orders:    CBC and differential    Comprehensive metabolic panel; Future    Lipid panel    Encounter for immunization    Orders:    influenza vaccine preservative-free 0.5 mL IM (Fluzone, Afluria, Fluarix, Flulaval)    Benign essential hypertension  -Blood pressure well-controlled on losartan         Attention deficit hyperactivity disorder (ADHD), unspecified ADHD type  -ADHD screening positive  -Reviewed EKG while in the office today   -Controlled substance agreement on file  -Continue Adderall XR 10mg, follow-up in 6 month or sooner if needed         Anxiety  Continue with Ativan as needed         Vitamin D deficiency  Continue with supplementation         Obesity (BMI 30.0-34.9)  -Encouraged dietary changes and increased exercise         Hypertriglyceridemia  Recommend healthy lifestyle choices for your cholesterol.  Low  fat/low cholesterol diet.  Limit/avoid red meat.  Eat more lean meat - chicken breast, ground turkey, fish.  Exercise 30 mins at least 5 times a week as tolerated.    Continue fish oil          Immunizations and preventive care screenings were discussed with patient today. Appropriate education was printed on patient's after visit summary.    Counseling:  Alcohol/drug use: discussed moderation in alcohol intake, the recommendations for healthy alcohol use, and avoidance of illicit drug use.  Dental Health: discussed importance of regular tooth brushing, flossing, and dental visits.  Injury prevention: discussed safety/seat belts, safety helmets, smoke detectors, carbon monoxide detectors, and smoking near bedding or upholstery.  Sexual health: discussed sexually transmitted diseases, partner selection, use of condoms, avoidance of unintended pregnancy, and contraceptive alternatives.  Exercise: the importance of regular exercise/physical activity was discussed. Recommend exercise 3-5 times per week for at least 30 minutes.          History of Present Illness     Adult Annual Physical:  Patient presents for annual physical. Patient presents today to follow-up on chronic conditions and for annual physical.       ADHD-adult ADHD self-report scale was performed at last office visit, patient was started on Vyvanse however did not start this medication as there was a $270 a month co-pay,  She feels that she may have ADHD, she feels like she has a racing mind, can not stay on task, feels disorganized, started a few years ago but has gotten worse    She was started on Adderall 10 mg extended release at last office visit  She feels much more controlled on this medication, denies any side effects, appetite has been good      HTN- has been monitoring at home, -137, DBP 92-95, denies any chest pain, headaches or changes in vision, she is well-controlled on losartan    Anxiety-continues with lorazepam as needed, uses very  "sparingly     Hyperlipidemia-ASCVD risk for 1%, noted to have mildly elevated triglycerides at 166, HDL 32, LDL 79, cholesterol 144    She reports a rash to her left shoulder, has been there for about 2 weeks comes and goes, itchy at times, has not tried OTC remedies .     Diet and Physical Activity:  - Diet/Nutrition: well balanced diet.  - Exercise: 3-4 times a week on average and 30-60 minutes on average.    Depression Screening:  - PHQ-2 Score: 0    General Health:  - Sleep: sleeps well and 7-8 hours of sleep on average.  - Hearing: normal hearing bilateral ears. right ear choric decreased hearing  - Vision: no vision problems, wears glasses and goes for regular eye exams.  - Dental: regular dental visits and brushes teeth twice daily.    /GYN Health:  - Follows with GYN: yes.   - Last menstrual cycle: 12/25/2024.   - History of STDs: no  - Contraception: male partner had vasectomy.      Advanced Care Planning:  - Has an advanced directive?: no    - Has a durable medical POA?: no    - ACP document given to patient?: no      Review of Systems      Objective   /84 (BP Location: Right arm, Patient Position: Sitting, Cuff Size: Standard)   Pulse 74   Temp (!) 97.2 °F (36.2 °C) (Temporal)   Ht 5' 2.72\" (1.593 m)   Wt 85.6 kg (188 lb 12.8 oz)   SpO2 98%   BMI 33.75 kg/m²     Physical Exam    "

## 2025-02-10 DIAGNOSIS — F90.2 ATTENTION DEFICIT HYPERACTIVITY DISORDER (ADHD), COMBINED TYPE: ICD-10-CM

## 2025-02-11 RX ORDER — DEXTROAMPHETAMINE SACCHARATE, AMPHETAMINE ASPARTATE MONOHYDRATE, DEXTROAMPHETAMINE SULFATE AND AMPHETAMINE SULFATE 2.5; 2.5; 2.5; 2.5 MG/1; MG/1; MG/1; MG/1
10 CAPSULE, EXTENDED RELEASE ORAL EVERY MORNING
Qty: 30 CAPSULE | Refills: 0 | Status: SHIPPED | OUTPATIENT
Start: 2025-02-11

## 2025-03-12 DIAGNOSIS — F90.2 ATTENTION DEFICIT HYPERACTIVITY DISORDER (ADHD), COMBINED TYPE: ICD-10-CM

## 2025-03-14 RX ORDER — DEXTROAMPHETAMINE SACCHARATE, AMPHETAMINE ASPARTATE MONOHYDRATE, DEXTROAMPHETAMINE SULFATE AND AMPHETAMINE SULFATE 2.5; 2.5; 2.5; 2.5 MG/1; MG/1; MG/1; MG/1
10 CAPSULE, EXTENDED RELEASE ORAL EVERY MORNING
Qty: 30 CAPSULE | Refills: 0 | Status: SHIPPED | OUTPATIENT
Start: 2025-03-14

## 2025-03-26 DIAGNOSIS — I10 BENIGN ESSENTIAL HYPERTENSION: ICD-10-CM

## 2025-03-27 RX ORDER — LOSARTAN POTASSIUM 50 MG/1
50 TABLET ORAL DAILY
Qty: 90 TABLET | Refills: 1 | Status: SHIPPED | OUTPATIENT
Start: 2025-03-27

## 2025-04-27 ENCOUNTER — PATIENT MESSAGE (OUTPATIENT)
Age: 45
End: 2025-04-27

## 2025-04-27 DIAGNOSIS — F90.2 ATTENTION DEFICIT HYPERACTIVITY DISORDER (ADHD), COMBINED TYPE: ICD-10-CM

## 2025-04-28 RX ORDER — DEXTROAMPHETAMINE SACCHARATE, AMPHETAMINE ASPARTATE MONOHYDRATE, DEXTROAMPHETAMINE SULFATE AND AMPHETAMINE SULFATE 2.5; 2.5; 2.5; 2.5 MG/1; MG/1; MG/1; MG/1
10 CAPSULE, EXTENDED RELEASE ORAL EVERY MORNING
Qty: 30 CAPSULE | Refills: 0 | Status: SHIPPED | OUTPATIENT
Start: 2025-04-28

## 2025-05-28 DIAGNOSIS — F90.2 ATTENTION DEFICIT HYPERACTIVITY DISORDER (ADHD), COMBINED TYPE: ICD-10-CM

## 2025-05-30 RX ORDER — DEXTROAMPHETAMINE SACCHARATE, AMPHETAMINE ASPARTATE MONOHYDRATE, DEXTROAMPHETAMINE SULFATE AND AMPHETAMINE SULFATE 2.5; 2.5; 2.5; 2.5 MG/1; MG/1; MG/1; MG/1
10 CAPSULE, EXTENDED RELEASE ORAL EVERY MORNING
Qty: 30 CAPSULE | Refills: 0 | Status: SHIPPED | OUTPATIENT
Start: 2025-05-30

## 2025-07-09 DIAGNOSIS — F90.2 ATTENTION DEFICIT HYPERACTIVITY DISORDER (ADHD), COMBINED TYPE: ICD-10-CM

## 2025-07-13 RX ORDER — DEXTROAMPHETAMINE SACCHARATE, AMPHETAMINE ASPARTATE MONOHYDRATE, DEXTROAMPHETAMINE SULFATE AND AMPHETAMINE SULFATE 2.5; 2.5; 2.5; 2.5 MG/1; MG/1; MG/1; MG/1
10 CAPSULE, EXTENDED RELEASE ORAL EVERY MORNING
Qty: 30 CAPSULE | Refills: 0 | Status: SHIPPED | OUTPATIENT
Start: 2025-07-13

## 2025-07-15 ENCOUNTER — OFFICE VISIT (OUTPATIENT)
Age: 45
End: 2025-07-15
Payer: COMMERCIAL

## 2025-07-15 VITALS
TEMPERATURE: 98.6 F | HEIGHT: 63 IN | WEIGHT: 192.8 LBS | BODY MASS INDEX: 34.16 KG/M2 | DIASTOLIC BLOOD PRESSURE: 82 MMHG | SYSTOLIC BLOOD PRESSURE: 110 MMHG | OXYGEN SATURATION: 96 % | HEART RATE: 102 BPM

## 2025-07-15 DIAGNOSIS — E78.2 MIXED HYPERLIPIDEMIA: ICD-10-CM

## 2025-07-15 DIAGNOSIS — F41.9 ANXIETY: ICD-10-CM

## 2025-07-15 DIAGNOSIS — Z13.228 SCREENING FOR METABOLIC DISORDER: ICD-10-CM

## 2025-07-15 DIAGNOSIS — E66.811 OBESITY (BMI 30.0-34.9): ICD-10-CM

## 2025-07-15 DIAGNOSIS — I10 BENIGN ESSENTIAL HYPERTENSION: ICD-10-CM

## 2025-07-15 DIAGNOSIS — E55.9 VITAMIN D DEFICIENCY: ICD-10-CM

## 2025-07-15 DIAGNOSIS — Z23 ENCOUNTER FOR IMMUNIZATION: ICD-10-CM

## 2025-07-15 DIAGNOSIS — E78.1 HYPERTRIGLYCERIDEMIA: ICD-10-CM

## 2025-07-15 DIAGNOSIS — F90.9 ATTENTION DEFICIT HYPERACTIVITY DISORDER (ADHD), UNSPECIFIED ADHD TYPE: Primary | ICD-10-CM

## 2025-07-15 PROCEDURE — 90471 IMMUNIZATION ADMIN: CPT

## 2025-07-15 PROCEDURE — 99214 OFFICE O/P EST MOD 30 MIN: CPT | Performed by: NURSE PRACTITIONER

## 2025-07-15 PROCEDURE — 90715 TDAP VACCINE 7 YRS/> IM: CPT

## 2025-07-15 RX ORDER — LOSARTAN POTASSIUM 25 MG/1
25 TABLET ORAL DAILY
Qty: 90 TABLET | Refills: 1 | Status: SHIPPED | OUTPATIENT
Start: 2025-07-15

## 2025-07-16 ENCOUNTER — TELEPHONE (OUTPATIENT)
Dept: ADMINISTRATIVE | Facility: OTHER | Age: 45
End: 2025-07-16

## 2025-07-16 NOTE — TELEPHONE ENCOUNTER
Upon review of the In Basket request and the patient's chart, initial outreach has been made via fax to facility. Please see Contacts section for details.     Thank you  Leeann Caban

## 2025-07-16 NOTE — LETTER
Procedure Request Form: Cervical Cancer Screening      Date Requested: 25  Patient: Kandis Puga  Patient : 1980   Referring Provider: RYAN Durán        Date of Procedure ______________________________       The above patient has informed us that they have completed their   most recent Cervical Cancer Screening at your facility. Please complete   this form and attach all corresponding procedure reports/results.    Comments __________________________________________________________  ____________________________________________________________________  ____________________________________________________________________  ____________________________________________________________________    Facility Completing Procedure _________________________________________    Form Completed By (print name) _______________________________________      Signature __________________________________________________________      These reports are needed for  compliance.    Please fax this completed form and a copy of the procedure report to the [facility] Based Department as soon as possible via Fax [phone], [facility]: Phone [phone]. Our office is located at [address] [place] [address].    We thank you for your assistance in treating our mutual patient.

## 2025-07-16 NOTE — TELEPHONE ENCOUNTER
----- Message from Jasmine QUIROGA sent at 7/15/2025  3:12 PM EDT -----  Regarding: pap  ..07/15/25 3:13 PM    Hello, our patient Kandis Puga has had Pap Smear (HPV) aka Cervical Cancer Screening completed/performed. Please assist in updating the patient chart by making an External outreach to [facility] facility located in [place]. The date of service is 2023.    Thank you,  Jasmine Acuna MA  [facility] [place]

## 2025-07-17 NOTE — TELEPHONE ENCOUNTER
Upon review of the In Basket request we have found as a result of outreach that the patient did not have the requested item(s) completed or the patient was not seen by the practice. Per office last pap was 2019 which is updated in the chart.     Any additional questions or concerns should be emailed to the Practice Liaisons via the appropriate education email address, please do not reply via In Basket.    Thank you  Leeann Caban   [facility]